# Patient Record
Sex: MALE | Race: BLACK OR AFRICAN AMERICAN | NOT HISPANIC OR LATINO | Employment: FULL TIME | URBAN - METROPOLITAN AREA
[De-identification: names, ages, dates, MRNs, and addresses within clinical notes are randomized per-mention and may not be internally consistent; named-entity substitution may affect disease eponyms.]

---

## 2017-02-10 ENCOUNTER — COMMUNICATION - HEALTHEAST (OUTPATIENT)
Dept: BEHAVIORAL HEALTH | Facility: CLINIC | Age: 57
End: 2017-02-10

## 2020-04-08 ENCOUNTER — COMMUNICATION - HEALTHEAST (OUTPATIENT)
Dept: BEHAVIORAL HEALTH | Facility: CLINIC | Age: 60
End: 2020-04-08

## 2021-06-07 NOTE — TELEPHONE ENCOUNTER
Patient left a message with [facility] and the [facility] stating he was unable to see the Suboxone prescriber he was referred to after discharge from 2700.    Called patient this afternoon to discuss options. Patient states he was able to be seen by Dr. Holcomb in her [facility] clinic yesterday. Plans to continue with Dr. Holcomb for Suboxone management.     Patient states he is doing well considering the circumstances of the current pandemic. Notes not issues or concerns.     Hemanth Christianson PA-C   Behavioral Health & Addiction Medicine  CD Unit # [phone]  Pager # [phone]

## 2021-06-16 PROBLEM — F11.20 SEVERE OPIOID USE DISORDER (H): Status: ACTIVE | Noted: 2020-02-27

## 2022-11-10 ENCOUNTER — TELEPHONE (OUTPATIENT)
Dept: NEUROSURGERY | Facility: CLINIC | Age: 62
End: 2022-11-10

## 2022-11-10 NOTE — TELEPHONE ENCOUNTER
[facility] Call Center    Phone Message    May a detailed message be left on voicemail: yes     Reason for Call: Appointment Intake    Referring Provider Name: Self refer for a 2nd Opinion   Diagnosis and/or Symptoms: Cauda Equina of the Lumbar / Sacral Spine  Action Taken: Message routed to:  [facility]    Travel Screening: Not Applicable     Kaity called in to make an urgent appt with Dr Natarajan due to the diagnosis of the patient , we could not get an appt until 12/1 so we made it for that date for now but they would like to speak with the team to see if we can do anything sooner -- they will be faxing over records and the reports / imaging. MRI was taken @ [facility]     This will be a W/C claim *Kaity will be calling back with the Claim # but all of the other info was added for now     Please c/b to discuss

## 2022-11-11 DIAGNOSIS — M54.9 BACK PAIN: Primary | ICD-10-CM

## 2022-11-15 ENCOUNTER — TELEPHONE (OUTPATIENT)
Dept: NEUROSURGERY | Facility: CLINIC | Age: 62
End: 2022-11-15

## 2022-11-15 NOTE — TELEPHONE ENCOUNTER
[facility] Call Center    Phone Message    May a detailed message be left on voicemail: yes     Reason for Call: Other: Kaity Ca, pts [facility] wanting to let. Dr Natarajan know that pt is ready to come in for consult for surgery. Kaity asking for call back at [phone]  - offered to schedule apt, Kaity declined, advised to contact pt to schedule apt.       Action Taken: Other: Spine - Dr. Mallory Natarajan    Travel Screening: Not Applicable

## 2022-11-16 ENCOUNTER — TELEPHONE (OUTPATIENT)
Dept: NEUROSURGERY | Facility: CLINIC | Age: 62
End: 2022-11-16

## 2022-11-16 NOTE — TELEPHONE ENCOUNTER
[facility] Call Center    Phone Message    May a detailed message be left on voicemail: yes     Reason for Call Kaity Who is Patient C is asking for call back to discuss Copies of Images / Order for Back. Please call Kaity Action Taken: Other: [facility]    Travel Screening: Not Applicable

## 2022-11-17 ENCOUNTER — ANCILLARY PROCEDURE (OUTPATIENT)
Dept: GENERAL RADIOLOGY | Facility: CLINIC | Age: 62
End: 2022-11-17
Attending: SURGERY
Payer: OTHER MISCELLANEOUS

## 2022-11-17 DIAGNOSIS — M54.9 BACK PAIN: ICD-10-CM

## 2022-11-17 PROCEDURE — 72110 X-RAY EXAM L-2 SPINE 4/>VWS: CPT | Mod: TC | Performed by: RADIOLOGY

## 2022-11-22 ENCOUNTER — MEDICAL CORRESPONDENCE (OUTPATIENT)
Dept: NEUROSURGERY | Facility: CLINIC | Age: 62
End: 2022-11-22

## 2022-12-01 ENCOUNTER — PREP FOR PROCEDURE (OUTPATIENT)
Dept: NEUROSURGERY | Facility: CLINIC | Age: 62
End: 2022-12-01

## 2022-12-01 ENCOUNTER — LAB (OUTPATIENT)
Dept: LAB | Facility: HOSPITAL | Age: 62
End: 2022-12-01
Payer: COMMERCIAL

## 2022-12-01 ENCOUNTER — TELEPHONE (OUTPATIENT)
Dept: NEUROSURGERY | Facility: CLINIC | Age: 62
End: 2022-12-01

## 2022-12-01 ENCOUNTER — OFFICE VISIT (OUTPATIENT)
Dept: NEUROSURGERY | Facility: CLINIC | Age: 62
End: 2022-12-01
Payer: OTHER MISCELLANEOUS

## 2022-12-01 VITALS
OXYGEN SATURATION: 98 % | SYSTOLIC BLOOD PRESSURE: 132 MMHG | BODY MASS INDEX: 30.06 KG/M2 | WEIGHT: 210 LBS | DIASTOLIC BLOOD PRESSURE: 96 MMHG | HEIGHT: 70 IN | HEART RATE: 80 BPM

## 2022-12-01 DIAGNOSIS — G83.4: Primary | ICD-10-CM

## 2022-12-01 DIAGNOSIS — G83.4 CAUDA EQUINA SYNDROME WITH NEUROGENIC BLADDER (H): Primary | ICD-10-CM

## 2022-12-01 DIAGNOSIS — M48.061 SPINAL STENOSIS, LUMBAR REGION, WITHOUT NEUROGENIC CLAUDICATION: ICD-10-CM

## 2022-12-01 DIAGNOSIS — R33.9: Primary | ICD-10-CM

## 2022-12-01 DIAGNOSIS — R20.0 LEG NUMBNESS: ICD-10-CM

## 2022-12-01 DIAGNOSIS — M51.26 LUMBAR DISC HERNIATION: ICD-10-CM

## 2022-12-01 DIAGNOSIS — R20.0 SADDLE ANESTHESIA: ICD-10-CM

## 2022-12-01 DIAGNOSIS — Z01.818 PRE-OP TESTING: ICD-10-CM

## 2022-12-01 DIAGNOSIS — R29.898 WEAKNESS OF BOTH LOWER EXTREMITIES: ICD-10-CM

## 2022-12-01 LAB
ANION GAP SERPL CALCULATED.3IONS-SCNC: 10 MMOL/L (ref 7–15)
APTT PPP: 26 SECONDS (ref 22–38)
BUN SERPL-MCNC: 10.1 MG/DL (ref 8–23)
CALCIUM SERPL-MCNC: 9.7 MG/DL (ref 8.8–10.2)
CHLORIDE SERPL-SCNC: 104 MMOL/L (ref 98–107)
CREAT SERPL-MCNC: 0.97 MG/DL (ref 0.67–1.17)
DEPRECATED HCO3 PLAS-SCNC: 26 MMOL/L (ref 22–29)
ERYTHROCYTE [DISTWIDTH] IN BLOOD BY AUTOMATED COUNT: 12.4 % (ref 10–15)
GFR SERPL CREATININE-BSD FRML MDRD: 88 ML/MIN/1.73M2
GLUCOSE SERPL-MCNC: 97 MG/DL (ref 70–99)
HCT VFR BLD AUTO: 48.5 % (ref 40–53)
HGB BLD-MCNC: 15.7 G/DL (ref 13.3–17.7)
INR PPP: 0.91 (ref 0.85–1.15)
MCH RBC QN AUTO: 31.4 PG (ref 26.5–33)
MCHC RBC AUTO-ENTMCNC: 32.4 G/DL (ref 31.5–36.5)
MCV RBC AUTO: 97 FL (ref 78–100)
PLATELET # BLD AUTO: 230 10E3/UL (ref 150–450)
POTASSIUM SERPL-SCNC: 4.1 MMOL/L (ref 3.4–5.3)
RBC # BLD AUTO: 5 10E6/UL (ref 4.4–5.9)
SODIUM SERPL-SCNC: 140 MMOL/L (ref 136–145)
WBC # BLD AUTO: 9.5 10E3/UL (ref 4–11)

## 2022-12-01 PROCEDURE — 85027 COMPLETE CBC AUTOMATED: CPT

## 2022-12-01 PROCEDURE — 85730 THROMBOPLASTIN TIME PARTIAL: CPT

## 2022-12-01 PROCEDURE — 82310 ASSAY OF CALCIUM: CPT

## 2022-12-01 PROCEDURE — 99204 OFFICE O/P NEW MOD 45 MIN: CPT | Performed by: SURGERY

## 2022-12-01 PROCEDURE — 85610 PROTHROMBIN TIME: CPT

## 2022-12-01 PROCEDURE — 36415 COLL VENOUS BLD VENIPUNCTURE: CPT

## 2022-12-01 ASSESSMENT — PATIENT HEALTH QUESTIONNAIRE - PHQ9: SUM OF ALL RESPONSES TO PHQ QUESTIONS 1-9: 17

## 2022-12-01 NOTE — PATIENT INSTRUCTIONS
Lumbar decompression L4-5    Please review COMPLETE information about your proposed surgery, pre-operative requirements, post-operative course and expectations - available in a folder provided to you in clinic!    Your surgery scheduler will call you within 3 business days to begin the process of scheduling your surgery and appointments.     Pre-Operative    Pre-operative physical / Lab work with primary care physician within 30 days of surgical date.    If all pre-op appointments/test are not completed prior to your surgery date, you will be asked to reschedule your surgery.           As part of preparation for your upcoming procedure you are required to have a test for the novel Coronavirus/COVID-19.  The test needs to be completed within 4 days (96) hours of surgery.   We will assist you in scheduling this.   You may NOT receive the COVID-19 vaccine or booster 2 weeks before or after surgery.    Readiness Visits    Prior to surgery, you may have a telephone or in person readiness visit with our RN team to discuss your upcomming surgery, results of your pre-op physical, and lab work.   If you will require a collar/neck brace after surgery, you will be fitted for one at your readiness visit prior to surgery (scheduled by the surgery scheduler).     Shower procedure    Hibiclens shower: Use one packet the night before surgery and one packet the morning of surgery for a whole body shower. Avoid face, hair, and genitals.      Eating/Drinking    Stop all solid foods 8 hours before surgery.  Stop all clear liquids 2 hours prior to arrival time     Clear liquids include water, clear juice, black coffee, or clear tea without milk, Gatorade, clear soda.     Medications - please refer to the pre-operative medication instructions sheet in your folder    Hold Aspirin, NSAIDs (Advil/Ibuprofen, Indocin, Naproxen,Nuprin,Relafen/Nabumetone, Diclofenac,Meloxicam, Aleve, Celebrex) and all vitamins and supplements x 7-10 days prior  to surgical date  You can take Tylenol (Acetaminophen) for pain up until the date of your surgery   Do not exceed 3,000 mg per day   Any other medications prescribed, please discuss with your primary care provider at your pre-operative physical. Please discuss when/if it is safe for you to stop taking blood thinners with your primary care provider.   We will NOT provide pain medications prior to surgery. We will prescribe post-op pain medications for up to 6 weeks after surgery.       FMLA/Short-term disability    If you are currently employed, you will likely need to be off work for 4-6 weeks for post-op recovery and healing.  Please fax any FMLA/short term disability paperwork to [phone], mail it into the clinic, drop it off in person, or send via a [employer] message.   You may also call our clinic with the date in which you'd like to return to work, and we can provide a work letter to your employer  We will support Short-Term Disability up to 12 weeks, beginning the date of your surgery. We do NOT support Long-Term Disability/Social Security Benefits.     Pain Management after surgery    Dealing with pain    As your body heals, you might feel a stabbing, burning, or aching pain. You may also have some numbness.  Everyone feels pain differently, we may ask you to rate your pain using a pain scale. This will let us know how much pain you feel.   Keep in mind that medicine won't take away all of your pain. It helps to try other ways to relax and ease pain.     Things to help with pain    After surgery, we will give you medicine for your pain. These medications work well, but they can make you drowsy, itchy, or sick to your stomach, and constipated. Try to take narcotics with food if they cause nausea.   For mild to moderate pain, you can take medication such as Tylenol or Ibuprofen. These can be used with narcotics and muscle relaxants. *If you have had a fusion: do NOT use NSAIDs for 6 months after surgery.   Do  NOT drive while taking narcotic pain medication  Do NOT drink alcohol while using narcotic pain medication  You can utilize ice as needed (no longer than 20 minutes at one time) you may apply this over your covered incision  Utilize heat for muscle spasms, do not apply heat over your incision  If a muscle relaxer is prescribed, please do NOT take it at the same time as your narcotic pain medication. Take them at least 90 minutes apart.   You may also use pain cream/patches on sore muscles. Do NOT apply these on your incision. Patches may be cut in 1/2 if needed.     *After your surgery, if you will be staying in-patient, a nursing team will be monitoring you closely throughout your stay and communicate your health status to your surgeon and other providers.  You will be seen by Advanced Practice Providers (e.g., nurse practitioners, clinic nurse specialists, and physician assistants) who will check on you regularly to assess the status of your surgical recovery.     Incision Care    Look at your incision site every day. You  may need a mirror, or family member to help you.   Do not submerge your incision in water such as pools, hot tubs, baths for at least 6 weeks or until incision is healed  You may get your incision wet in the shower. Allow water and soap to run over incision, and gently pat dry.   Remove the dressing the day after you are discharged from the hospital. Keep the incision clean and dry at all times. This may require several bandage changes.   Contact us right away if you have:   Fever over 101 degrees farenheit  Green or yellow drainage (pus) from your incision or increased bloody drainage   Redness, swelling, or warmth at your surgery site   Notify the clinic [phone].    Activity Restrictions    For the first 6 weeks, no lifting,pushing, or pulling > 5-10 pounds, no bending, twisting.  Use the stairs in moderation   Walking: Walking is the best way to start exercise after surgery. Take short  frequent walks. You may gradually increase the distance as tolerated. If you feel pain, decrease your activity, but DO NOT stop walking. Walking will help you regain strength.  Avoid prolonged positioning for longer than 30 minutes (change positions frequently while awake)  No contact sports until after follow up visit  No high impact activities such as; running/jogging, snowmobile or 4 coyne riding or any other recreational vehicles until deemed safe by your surgeon/care team.   Please call the clinic if you develop any of the following symptoms:  Swelling and/or warmth in one or both legs  Pain or tenderness in your leg, ankle, foot, or arm   Red or discolored/pale skin     Post-Op Follow Up Appointments    We will call you to schedule these appointments after your discharge from the hospital.   Incision assessment within 2 weeks with a Registered Nurse   6 week post-op with a Nurse Practitioner/Physician Assistant. Your surgeon will be available on this day.

## 2022-12-01 NOTE — NURSING NOTE
Neurosurgery consultation was requested by: ROMINA   Pain: back   Radicular Pain is present: lower back goes down to  both legs   Lhermitte sign: no   Motor complaints: both legs   Sensory complaints: both legs   Gait and balance issues: yes   Bowel or bladder issues: yes   Duration of SX is: 31/8/22  The symptoms are worse with: sitting standing and walking   The symptoms are better with: laying down and heat   Injury: work injury   Severity is: severe   Patient has tried the following conservative measures: none   KATHERINE score is: 96%  Ronald Timmons MA

## 2022-12-01 NOTE — TELEPHONE ENCOUNTER
[facility] Call Center    Phone Message    May a detailed message be left on voicemail: yes     Reason for Call: Other: Pt called returning call from [facility].    Please call Pt back at [phone].    Action Taken: Message routed to:  [facility] Neurosurgery    Travel Screening: Not Applicable

## 2022-12-01 NOTE — LETTER
12/1/2022         RE: Richard Hickman  [address]  [place] [address]        Dear Colleague,    Thank you for referring your patient, Richard Hickman, to the [facility]. Please see a copy of my visit note below.    NEUROSURGERY CONSULTATION NOTE      CONSULTATION ASSESSMENT AND PLAN:    61 yo male who presents with symptoms of cauda equina syndrome including leg weakness, numbness, saddle anesthesia and urinary retention. Lumbar xray shows reversal of normal lordosis centered at L3 without instability.   MRI of his lumbar spine shows severe canal stenosis at L2-3 and L4-5. L2-3 he has ligamentous and facet hypertrophy in setting of a broad based disc contributing to his severe stenosis and at lumbar 4-5 he has a large disc herniation very severely impinging his nerve roots. Recommend urgent decompression including L2-3 midline sparing decompression with hemilaminectomies, medial facetectomies and foraminotomies and at lumbar 4-5 bilateral laminectomy, medial facetectomies, foraminotomies and microdiscectomies. Risks and benefits of surgery discussed including but not limited to infection, hematoma, nerve damage including paralysis, post op radiculitis, durotomy, residual symptoms, risks associated with the use of general anesthesia, blood clots in the lungs or legs. We discussed symptoms have been ongoing some time so recovery may take a while and he may have residual deficits. The patient and his wife appeared to understand and agreed to proceed.     I spent more than 45 minutes in this apt, examining the pt, reviewing the scans, reviewing notes from chart, discussing treatment options with risks and benefits and coordinating care.     Mallory Natarajan MD      HPI:  61 yo male who presents with leg numbness which extends into hips. He has zingers in his legs with laying down. Shooting pain down legs in any position. Symptoms can improve with laying down with flexion of  his legs. Has not been able to walk. He crawls around his house to get to bathroom etc. Difficulty initiating urination and having retention. He will have urinary leakage following that. Constipated. Difficulty performing bowel movement due to pain. Has saddle anesthesia in groin and buttocks. Can have erection but cannot feel that.     Symptoms started day after injury. 10/31/22. 11/1/22 was when symptoms started.     Horse tree chestnut natural therapy - pain does lighten up.opoid's- does help him sleep but became agitated. So he can't tolerate that.     Past Medical History:   Diagnosis Date     Essential hypertension      Past surgical history-  Appendectomy at 18    REVIEW OF SYSTEMS:  See ROS Form under media     MEDICATIONS:  Current Outpatient Medications   Medication Sig Dispense Refill     amLODIPine (NORVASC) 5 MG tablet [AMLODIPINE (NORVASC) 5 MG TABLET] Take 1 tablet (5 mg total) by mouth daily. 30 tablet 0     aspirin 81 mg chewable tablet [ASPIRIN 81 MG CHEWABLE TABLET] Chew 81 mg every morning.       atorvastatin (LIPITOR) 20 MG tablet [ATORVASTATIN (LIPITOR) 20 MG TABLET] Take 1 tablet (20 mg total) by mouth at bedtime. 30 tablet 0     buprenorphine-naloxone (SUBOXONE SL FILM) 2-0.5 mg Film per sublingual film [BUPRENORPHINE-NALOXONE (SUBOXONE SL FILM) 2-0.5 MG FILM PER SUBLINGUAL FILM] Place 1 Film under the tongue 4 (four) times a day. 60 Film 0     cholecalciferol, vitamin D3, 1,000 unit (25 mcg) tablet [CHOLECALCIFEROL, VITAMIN D3, 1,000 UNIT (25 MCG) TABLET] Take 1,000 Units by mouth every morning.       naloxone (NARCAN) 4 mg/actuation nasal spray [NALOXONE (NARCAN) 4 MG/ACTUATION NASAL SPRAY] 1 spray (4 mg dose) into one nostril for opioid reversal. Call 911. May repeat if no response in 3 minutes. 1 Box 0     OMEGA-3/DHA/EPA/FISH OIL (FISH OIL-OMEGA-3 FATTY ACIDS) 300-1,000 mg capsule [OMEGA-3/DHA/EPA/FISH OIL (FISH OIL-OMEGA-3 FATTY ACIDS) 300-1,000 MG CAPSULE] Take 1 capsule (1 g total) by  "mouth daily. 30 capsule 0     terbinafine HCl (LAMISIL) 1 % cream [TERBINAFINE HCL (LAMISIL) 1 % CREAM] Apply on both feet BID. 30 g 2     traZODone (DESYREL) 100 MG tablet [TRAZODONE (DESYREL) 100 MG TABLET] Take 0.5-1 tablets ( mg total) by mouth at bedtime as needed, may repeat once for sleep. 30 tablet 0         ALLERGIES/SENSITIVITIES:     No Known Allergies    PERTINENT SOCIAL HISTORY:   Social History     Socioeconomic History     Marital status: Single   Tobacco Use     Smoking status: Every Day     Packs/day: 0.10     Types: Cigarettes     Start date: 6/13/1981     Smokeless tobacco: Never   Substance and Sexual Activity     Alcohol use: No     Drug use: No     Sexual activity: Never         FAMILY HISTORY:  No family history on file.     PHYSICAL EXAM:   Constitutional: BP (!) 132/96   Pulse 80   Ht 5' 10\" (1.778 m)   Wt 210 lb (95.3 kg)   SpO2 98%   BMI 30.13 kg/m       Mental Status: A & O in no acute distress.  Affect is appropriate.  Speech is fluent.  Recent and remote memory are intact.  Attention span and concentration are normal.    Motor:  Normal bulk and tone all muscle groups of upper and lower extremities.    Strength:  Diffusely 4/5 in LE      Sensory: Sensation intact bilaterally to light touch except diminished in saddle region and diffusely in LE     Coordination: laying down during visit due to uncomfortableness. Ambulation not observed.       Reflexes; supinator, biceps, triceps, knee/ ankle jerk intact. No north's    IMAGING:  I personally reviewed all radiographic images         Cc:   No Ref-Primary, Physician               Again, thank you for allowing me to participate in the care of your patient.        Sincerely,        Mallory Natarajan MD    "

## 2022-12-01 NOTE — PROGRESS NOTES
NEUROSURGERY CONSULTATION NOTE      CONSULTATION ASSESSMENT AND PLAN:    61 yo male who presents with symptoms of cauda equina syndrome including leg weakness, numbness, saddle anesthesia and urinary retention. Lumbar xray shows reversal of normal lordosis centered at L3 without instability.   MRI of his lumbar spine shows severe canal stenosis at L2-3 and L4-5. L2-3 he has ligamentous and facet hypertrophy in setting of a broad based disc contributing to his severe stenosis and at lumbar 4-5 he has a large disc herniation very severely impinging his nerve roots. Recommend urgent decompression including L2-3 midline sparing decompression with hemilaminectomies, medial facetectomies and foraminotomies and at lumbar 4-5 bilateral laminectomy, medial facetectomies, foraminotomies and microdiscectomies. Risks and benefits of surgery discussed including but not limited to infection, hematoma, nerve damage including paralysis, post op radiculitis, durotomy, residual symptoms, risks associated with the use of general anesthesia, blood clots in the lungs or legs. We discussed symptoms have been ongoing some time so recovery may take a while and he may have residual deficits. The patient and his wife appeared to understand and agreed to proceed.     I spent more than 45 minutes in this apt, examining the pt, reviewing the scans, reviewing notes from chart, discussing treatment options with risks and benefits and coordinating care.     Mallory Natarajan MD      HPI:  61 yo male who presents with leg numbness which extends into hips. He has zingers in his legs with laying down. Shooting pain down legs in any position. Symptoms can improve with laying down with flexion of his legs. Has not been able to walk. He crawls around his house to get to bathroom etc. Difficulty initiating urination and having retention. He will have urinary leakage following that. Constipated. Difficulty performing bowel movement due to pain. Has  saddle anesthesia in groin and buttocks. Can have erection but cannot feel that.     Symptoms started day after injury. 10/31/22. 11/1/22 was when symptoms started.     Horse tree chestnut natural therapy - pain does lighten up.opoid's- does help him sleep but became agitated. So he can't tolerate that.     Past Medical History:   Diagnosis Date     Essential hypertension      Past surgical history-  Appendectomy at 18    REVIEW OF SYSTEMS:  See ROS Form under media     MEDICATIONS:  Current Outpatient Medications   Medication Sig Dispense Refill     amLODIPine (NORVASC) 5 MG tablet [AMLODIPINE (NORVASC) 5 MG TABLET] Take 1 tablet (5 mg total) by mouth daily. 30 tablet 0     aspirin 81 mg chewable tablet [ASPIRIN 81 MG CHEWABLE TABLET] Chew 81 mg every morning.       atorvastatin (LIPITOR) 20 MG tablet [ATORVASTATIN (LIPITOR) 20 MG TABLET] Take 1 tablet (20 mg total) by mouth at bedtime. 30 tablet 0     buprenorphine-naloxone (SUBOXONE SL FILM) 2-0.5 mg Film per sublingual film [BUPRENORPHINE-NALOXONE (SUBOXONE SL FILM) 2-0.5 MG FILM PER SUBLINGUAL FILM] Place 1 Film under the tongue 4 (four) times a day. 60 Film 0     cholecalciferol, vitamin D3, 1,000 unit (25 mcg) tablet [CHOLECALCIFEROL, VITAMIN D3, 1,000 UNIT (25 MCG) TABLET] Take 1,000 Units by mouth every morning.       naloxone (NARCAN) 4 mg/actuation nasal spray [NALOXONE (NARCAN) 4 MG/ACTUATION NASAL SPRAY] 1 spray (4 mg dose) into one nostril for opioid reversal. Call 911. May repeat if no response in 3 minutes. 1 Box 0     OMEGA-3/DHA/EPA/FISH OIL (FISH OIL-OMEGA-3 FATTY ACIDS) 300-1,000 mg capsule [OMEGA-3/DHA/EPA/FISH OIL (FISH OIL-OMEGA-3 FATTY ACIDS) 300-1,000 MG CAPSULE] Take 1 capsule (1 g total) by mouth daily. 30 capsule 0     terbinafine HCl (LAMISIL) 1 % cream [TERBINAFINE HCL (LAMISIL) 1 % CREAM] Apply on both feet BID. 30 g 2     traZODone (DESYREL) 100 MG tablet [TRAZODONE (DESYREL) 100 MG TABLET] Take 0.5-1 tablets ( mg total) by  "mouth at bedtime as needed, may repeat once for sleep. 30 tablet 0         ALLERGIES/SENSITIVITIES:     No Known Allergies    PERTINENT SOCIAL HISTORY:   Social History     Socioeconomic History     Marital status: Single   Tobacco Use     Smoking status: Every Day     Packs/day: 0.10     Types: Cigarettes     Start date: 6/13/1981     Smokeless tobacco: Never   Substance and Sexual Activity     Alcohol use: No     Drug use: No     Sexual activity: Never         FAMILY HISTORY:  No family history on file.     PHYSICAL EXAM:   Constitutional: BP (!) 132/96   Pulse 80   Ht 5' 10\" (1.778 m)   Wt 210 lb (95.3 kg)   SpO2 98%   BMI 30.13 kg/m       Mental Status: A & O in no acute distress.  Affect is appropriate.  Speech is fluent.  Recent and remote memory are intact.  Attention span and concentration are normal.    Motor:  Normal bulk and tone all muscle groups of upper and lower extremities.    Strength:  Diffusely 4/5 in LE      Sensory: Sensation intact bilaterally to light touch except diminished in saddle region and diffusely in LE     Coordination: laying down during visit due to uncomfortableness. Ambulation not observed.       Reflexes; supinator, biceps, triceps, knee/ ankle jerk intact. No north's    IMAGING:  I personally reviewed all radiographic images         Cc:   No Ref-Primary, Physician           "

## 2022-12-06 NOTE — TELEPHONE ENCOUNTER
Patient is scheduled for surgery on 12/8/22. I gave the patient surgery details, and he verbalized understanding. Patient stated that he was told by  that he didn't need to have a COVID test.

## 2022-12-07 ENCOUNTER — ANESTHESIA EVENT (OUTPATIENT)
Dept: SURGERY | Facility: HOSPITAL | Age: 62
DRG: 030 | End: 2022-12-07
Payer: OTHER MISCELLANEOUS

## 2022-12-07 ENCOUNTER — TELEPHONE (OUTPATIENT)
Dept: NEUROSURGERY | Facility: CLINIC | Age: 62
End: 2022-12-07

## 2022-12-07 NOTE — TELEPHONE ENCOUNTER
Called patient, discussed surgery, post-op course, expectations, follow up plan.    Reviewed H&P from - 12/2/22 cleared for surgery.   Labs - WNL     MRI done on 11/3/22 - done at [facility], in [place]    To OR as planned. Check in - 0930    Nothing to eat or drink after midnight the night before surgery.     Bring all pertinent films to hospital the day of surgery.     Continue to refrain from NSAIDS (Ibuprofen, Aleve, Naprosyn), ASA, Over the counter herbal medications or supplements, anti-coagulants and blood thinners.     Shower Instructions: using a washcloth and a bottle of provided Hibiclens, wash your body, avoiding your face, hair, and genitals. Preferably, shower the night before surgery and the morning of surgery using a half a bottle each time for your whole body shower.    Patient confirmed they have help/assistance in place at home upon discharge, his wife.    Patient was informed that we will provide prescription of pain medication for post-operative pain if he chooses to use it. Pt has a hx of opioid abuse and does NOT want opioids prescribed after surgery. PCP recommends suboxone rx 4mg BID on the first day, then 4mg TID after if opioids are being prescribed.     Instructed patient about the following: After your surgery, if you will be staying in-patient, a nursing provider team will be monitoring you closely throughout your stay and communicate your health status to your surgeon and other providers.  You will be seen by Advanced Practice Providers (e.g., nurse practitioners, clinic nurse specialist, and physician assistants) who will check on you regularly to assess the status of your surgery.   All of pt's questions were answered to his satisfaction. He was informed that we will call after discharge to schedule all necessary post-op follow up appointments.     Destiny Thompson RN

## 2022-12-08 ENCOUNTER — APPOINTMENT (OUTPATIENT)
Dept: RADIOLOGY | Facility: HOSPITAL | Age: 62
DRG: 030 | End: 2022-12-08
Attending: NURSE PRACTITIONER
Payer: OTHER MISCELLANEOUS

## 2022-12-08 ENCOUNTER — HOSPITAL ENCOUNTER (INPATIENT)
Facility: HOSPITAL | Age: 62
LOS: 2 days | Discharge: HOME | DRG: 030 | End: 2022-12-10
Attending: SURGERY | Admitting: SURGERY
Payer: OTHER MISCELLANEOUS

## 2022-12-08 ENCOUNTER — ANESTHESIA (OUTPATIENT)
Dept: SURGERY | Facility: HOSPITAL | Age: 62
DRG: 030 | End: 2022-12-08
Payer: OTHER MISCELLANEOUS

## 2022-12-08 DIAGNOSIS — G83.4 CAUDA EQUINA COMPRESSION (H): Primary | ICD-10-CM

## 2022-12-08 DIAGNOSIS — I10 ESSENTIAL HYPERTENSION: ICD-10-CM

## 2022-12-08 LAB — GLUCOSE BLDC GLUCOMTR-MCNC: 90 MG/DL (ref 70–99)

## 2022-12-08 PROCEDURE — 63047 LAM FACETEC & FORAMOT LUMBAR: CPT | Performed by: SURGERY

## 2022-12-08 PROCEDURE — 63030 LAMOT DCMPRN NRV RT 1 LMBR: CPT | Mod: 59 | Performed by: SURGERY

## 2022-12-08 PROCEDURE — 258N000003 HC RX IP 258 OP 636: Performed by: ANESTHESIOLOGY

## 2022-12-08 PROCEDURE — 999N000141 HC STATISTIC PRE-PROCEDURE NURSING ASSESSMENT: Performed by: SURGERY

## 2022-12-08 PROCEDURE — 250N000011 HC RX IP 250 OP 636: Performed by: NURSE PRACTITIONER

## 2022-12-08 PROCEDURE — 250N000011 HC RX IP 250 OP 636: Performed by: ANESTHESIOLOGY

## 2022-12-08 PROCEDURE — 99254 IP/OBS CNSLTJ NEW/EST MOD 60: CPT | Performed by: CLINICAL NURSE SPECIALIST

## 2022-12-08 PROCEDURE — 250N000013 HC RX MED GY IP 250 OP 250 PS 637: Performed by: NURSE PRACTITIONER

## 2022-12-08 PROCEDURE — 999N000063 XR CROSSTABLE LATERAL LUMBAR SPINE PORTABLE

## 2022-12-08 PROCEDURE — 370N000017 HC ANESTHESIA TECHNICAL FEE, PER MIN: Performed by: SURGERY

## 2022-12-08 PROCEDURE — 999N000248 HC STATISTIC IV INSERT WITH US BY RN

## 2022-12-08 PROCEDURE — 360N000077 HC SURGERY LEVEL 4, PER MIN: Performed by: SURGERY

## 2022-12-08 PROCEDURE — 72020 X-RAY EXAM OF SPINE 1 VIEW: CPT

## 2022-12-08 PROCEDURE — 250N000009 HC RX 250: Performed by: NURSE PRACTITIONER

## 2022-12-08 PROCEDURE — 272N000001 HC OR GENERAL SUPPLY STERILE: Performed by: SURGERY

## 2022-12-08 PROCEDURE — 0SB20ZZ EXCISION OF LUMBAR VERTEBRAL DISC, OPEN APPROACH: ICD-10-PCS | Performed by: SURGERY

## 2022-12-08 PROCEDURE — 120N000001 HC R&B MED SURG/OB

## 2022-12-08 PROCEDURE — 258N000003 HC RX IP 258 OP 636: Performed by: NURSE PRACTITIONER

## 2022-12-08 PROCEDURE — 258N000003 HC RX IP 258 OP 636: Performed by: NURSE ANESTHETIST, CERTIFIED REGISTERED

## 2022-12-08 PROCEDURE — 82962 GLUCOSE BLOOD TEST: CPT

## 2022-12-08 PROCEDURE — 710N000009 HC RECOVERY PHASE 1, LEVEL 1, PER MIN: Performed by: SURGERY

## 2022-12-08 PROCEDURE — 250N000009 HC RX 250: Performed by: SURGERY

## 2022-12-08 PROCEDURE — 69990 MICROSURGERY ADD-ON: CPT | Mod: 59 | Performed by: SURGERY

## 2022-12-08 PROCEDURE — 63030 LAMOT DCMPRN NRV RT 1 LMBR: CPT | Mod: AS | Performed by: NURSE PRACTITIONER

## 2022-12-08 PROCEDURE — 250N000025 HC SEVOFLURANE, PER MIN: Performed by: SURGERY

## 2022-12-08 PROCEDURE — 250N000013 HC RX MED GY IP 250 OP 250 PS 637: Performed by: ANESTHESIOLOGY

## 2022-12-08 PROCEDURE — 250N000011 HC RX IP 250 OP 636: Performed by: NURSE ANESTHETIST, CERTIFIED REGISTERED

## 2022-12-08 PROCEDURE — 01NB0ZZ RELEASE LUMBAR NERVE, OPEN APPROACH: ICD-10-PCS | Performed by: SURGERY

## 2022-12-08 PROCEDURE — 63047 LAM FACETEC & FORAMOT LUMBAR: CPT | Mod: AS | Performed by: NURSE PRACTITIONER

## 2022-12-08 PROCEDURE — 250N000009 HC RX 250: Performed by: NURSE ANESTHETIST, CERTIFIED REGISTERED

## 2022-12-08 PROCEDURE — 250N000009 HC RX 250: Performed by: ANESTHESIOLOGY

## 2022-12-08 PROCEDURE — 00NY0ZZ RELEASE LUMBAR SPINAL CORD, OPEN APPROACH: ICD-10-PCS | Performed by: SURGERY

## 2022-12-08 PROCEDURE — 250N000013 HC RX MED GY IP 250 OP 250 PS 637: Performed by: CLINICAL NURSE SPECIALIST

## 2022-12-08 RX ORDER — POLYETHYLENE GLYCOL 3350 17 G/17G
17 POWDER, FOR SOLUTION ORAL DAILY
Status: DISCONTINUED | OUTPATIENT
Start: 2022-12-09 | End: 2022-12-10 | Stop reason: HOSPADM

## 2022-12-08 RX ORDER — ONDANSETRON 2 MG/ML
4 INJECTION INTRAMUSCULAR; INTRAVENOUS EVERY 30 MIN PRN
Status: DISCONTINUED | OUTPATIENT
Start: 2022-12-08 | End: 2022-12-08 | Stop reason: HOSPADM

## 2022-12-08 RX ORDER — FAMOTIDINE 20 MG/1
20 TABLET, FILM COATED ORAL 2 TIMES DAILY
Status: DISCONTINUED | OUTPATIENT
Start: 2022-12-08 | End: 2022-12-10 | Stop reason: HOSPADM

## 2022-12-08 RX ORDER — PROPOFOL 10 MG/ML
INJECTION, EMULSION INTRAVENOUS PRN
Status: DISCONTINUED | OUTPATIENT
Start: 2022-12-08 | End: 2022-12-08

## 2022-12-08 RX ORDER — ONDANSETRON 4 MG/1
4 TABLET, ORALLY DISINTEGRATING ORAL EVERY 30 MIN PRN
Status: DISCONTINUED | OUTPATIENT
Start: 2022-12-08 | End: 2022-12-08 | Stop reason: HOSPADM

## 2022-12-08 RX ORDER — CEFAZOLIN SODIUM/WATER 2 G/20 ML
2 SYRINGE (ML) INTRAVENOUS
Status: COMPLETED | OUTPATIENT
Start: 2022-12-08 | End: 2022-12-08

## 2022-12-08 RX ORDER — SODIUM CHLORIDE, SODIUM LACTATE, POTASSIUM CHLORIDE, CALCIUM CHLORIDE 600; 310; 30; 20 MG/100ML; MG/100ML; MG/100ML; MG/100ML
INJECTION, SOLUTION INTRAVENOUS CONTINUOUS
Status: DISCONTINUED | OUTPATIENT
Start: 2022-12-08 | End: 2022-12-08 | Stop reason: HOSPADM

## 2022-12-08 RX ORDER — DEXAMETHASONE SODIUM PHOSPHATE 4 MG/ML
3 INJECTION, SOLUTION INTRA-ARTICULAR; INTRALESIONAL; INTRAMUSCULAR; INTRAVENOUS; SOFT TISSUE 3 TIMES DAILY
Status: DISCONTINUED | OUTPATIENT
Start: 2022-12-08 | End: 2022-12-08

## 2022-12-08 RX ORDER — BUPIVACAINE HYDROCHLORIDE AND EPINEPHRINE 2.5; 5 MG/ML; UG/ML
INJECTION, SOLUTION EPIDURAL; INFILTRATION; INTRACAUDAL; PERINEURAL PRN
Status: DISCONTINUED | OUTPATIENT
Start: 2022-12-08 | End: 2022-12-08 | Stop reason: HOSPADM

## 2022-12-08 RX ORDER — GLYCOPYRROLATE 0.2 MG/ML
INJECTION, SOLUTION INTRAMUSCULAR; INTRAVENOUS PRN
Status: DISCONTINUED | OUTPATIENT
Start: 2022-12-08 | End: 2022-12-08

## 2022-12-08 RX ORDER — EPHEDRINE SULFATE 50 MG/ML
INJECTION, SOLUTION INTRAMUSCULAR; INTRAVENOUS; SUBCUTANEOUS PRN
Status: DISCONTINUED | OUTPATIENT
Start: 2022-12-08 | End: 2022-12-08

## 2022-12-08 RX ORDER — SODIUM CHLORIDE 9 MG/ML
INJECTION, SOLUTION INTRAVENOUS CONTINUOUS
Status: DISCONTINUED | OUTPATIENT
Start: 2022-12-08 | End: 2022-12-09

## 2022-12-08 RX ORDER — ONDANSETRON 2 MG/ML
4 INJECTION INTRAMUSCULAR; INTRAVENOUS EVERY 6 HOURS PRN
Status: DISCONTINUED | OUTPATIENT
Start: 2022-12-08 | End: 2022-12-10 | Stop reason: HOSPADM

## 2022-12-08 RX ORDER — AMLODIPINE BESYLATE 5 MG/1
5 TABLET ORAL DAILY
Status: DISCONTINUED | OUTPATIENT
Start: 2022-12-09 | End: 2022-12-10 | Stop reason: HOSPADM

## 2022-12-08 RX ORDER — UREA 10 %
500 LOTION (ML) TOPICAL 2 TIMES DAILY
Status: DISCONTINUED | OUTPATIENT
Start: 2022-12-08 | End: 2022-12-10 | Stop reason: HOSPADM

## 2022-12-08 RX ORDER — ACETAMINOPHEN 325 MG/1
650 TABLET ORAL EVERY 4 HOURS PRN
Status: DISCONTINUED | OUTPATIENT
Start: 2022-12-11 | End: 2022-12-10 | Stop reason: HOSPADM

## 2022-12-08 RX ORDER — GABAPENTIN 300 MG/1
300 CAPSULE ORAL 2 TIMES DAILY
Status: DISCONTINUED | OUTPATIENT
Start: 2022-12-08 | End: 2022-12-09

## 2022-12-08 RX ORDER — ONDANSETRON 2 MG/ML
INJECTION INTRAMUSCULAR; INTRAVENOUS PRN
Status: DISCONTINUED | OUTPATIENT
Start: 2022-12-08 | End: 2022-12-08

## 2022-12-08 RX ORDER — MAGNESIUM HYDROXIDE 1200 MG/15ML
LIQUID ORAL PRN
Status: DISCONTINUED | OUTPATIENT
Start: 2022-12-08 | End: 2022-12-08 | Stop reason: HOSPADM

## 2022-12-08 RX ORDER — LIDOCAINE 40 MG/G
CREAM TOPICAL
Status: DISCONTINUED | OUTPATIENT
Start: 2022-12-08 | End: 2022-12-08 | Stop reason: HOSPADM

## 2022-12-08 RX ORDER — HYDROMORPHONE HYDROCHLORIDE 1 MG/ML
0.4 INJECTION, SOLUTION INTRAMUSCULAR; INTRAVENOUS; SUBCUTANEOUS EVERY 5 MIN PRN
Status: DISCONTINUED | OUTPATIENT
Start: 2022-12-08 | End: 2022-12-08 | Stop reason: HOSPADM

## 2022-12-08 RX ORDER — ACETAMINOPHEN 325 MG/1
975 TABLET ORAL EVERY 8 HOURS
Status: DISCONTINUED | OUTPATIENT
Start: 2022-12-08 | End: 2022-12-10 | Stop reason: HOSPADM

## 2022-12-08 RX ORDER — ATORVASTATIN CALCIUM 10 MG/1
20 TABLET, FILM COATED ORAL AT BEDTIME
Status: DISCONTINUED | OUTPATIENT
Start: 2022-12-08 | End: 2022-12-10 | Stop reason: HOSPADM

## 2022-12-08 RX ORDER — KETAMINE HYDROCHLORIDE 10 MG/ML
INJECTION INTRAMUSCULAR; INTRAVENOUS PRN
Status: DISCONTINUED | OUTPATIENT
Start: 2022-12-08 | End: 2022-12-08

## 2022-12-08 RX ORDER — MULTIVITAMIN,THERAPEUTIC
1 TABLET ORAL DAILY
Status: DISCONTINUED | OUTPATIENT
Start: 2022-12-09 | End: 2022-12-10 | Stop reason: HOSPADM

## 2022-12-08 RX ORDER — LIDOCAINE HYDROCHLORIDE 10 MG/ML
INJECTION, SOLUTION INFILTRATION; PERINEURAL PRN
Status: DISCONTINUED | OUTPATIENT
Start: 2022-12-08 | End: 2022-12-08

## 2022-12-08 RX ORDER — DEXAMETHASONE SODIUM PHOSPHATE 10 MG/ML
10 INJECTION, SOLUTION INTRAMUSCULAR; INTRAVENOUS ONCE
Status: COMPLETED | OUTPATIENT
Start: 2022-12-08 | End: 2022-12-08

## 2022-12-08 RX ORDER — FENTANYL CITRATE 50 UG/ML
50 INJECTION, SOLUTION INTRAMUSCULAR; INTRAVENOUS EVERY 5 MIN PRN
Status: DISCONTINUED | OUTPATIENT
Start: 2022-12-08 | End: 2022-12-08 | Stop reason: HOSPADM

## 2022-12-08 RX ORDER — MAGNESIUM SULFATE 4 G/50ML
4 INJECTION INTRAVENOUS ONCE
Status: COMPLETED | OUTPATIENT
Start: 2022-12-08 | End: 2022-12-08

## 2022-12-08 RX ORDER — DIAZEPAM 5 MG
5 TABLET ORAL EVERY 4 HOURS PRN
Status: DISCONTINUED | OUTPATIENT
Start: 2022-12-08 | End: 2022-12-08

## 2022-12-08 RX ORDER — FAMOTIDINE 20 MG/1
20 TABLET, FILM COATED ORAL ONCE
Status: COMPLETED | OUTPATIENT
Start: 2022-12-08 | End: 2022-12-08

## 2022-12-08 RX ORDER — METHOCARBAMOL 500 MG/1
500 TABLET, FILM COATED ORAL EVERY 6 HOURS PRN
Status: DISCONTINUED | OUTPATIENT
Start: 2022-12-08 | End: 2022-12-10 | Stop reason: HOSPADM

## 2022-12-08 RX ORDER — DEXMEDETOMIDINE HYDROCHLORIDE 4 UG/ML
.1-1.2 INJECTION, SOLUTION INTRAVENOUS CONTINUOUS
Status: DISCONTINUED | OUTPATIENT
Start: 2022-12-08 | End: 2022-12-08 | Stop reason: HOSPADM

## 2022-12-08 RX ORDER — FENTANYL CITRATE 50 UG/ML
INJECTION, SOLUTION INTRAMUSCULAR; INTRAVENOUS PRN
Status: DISCONTINUED | OUTPATIENT
Start: 2022-12-08 | End: 2022-12-08

## 2022-12-08 RX ORDER — DEXAMETHASONE SODIUM PHOSPHATE 4 MG/ML
3 INJECTION, SOLUTION INTRA-ARTICULAR; INTRALESIONAL; INTRAMUSCULAR; INTRAVENOUS; SOFT TISSUE 3 TIMES DAILY
Status: DISCONTINUED | OUTPATIENT
Start: 2022-12-09 | End: 2022-12-09

## 2022-12-08 RX ORDER — PROCHLORPERAZINE MALEATE 10 MG
10 TABLET ORAL EVERY 6 HOURS PRN
Status: DISCONTINUED | OUTPATIENT
Start: 2022-12-08 | End: 2022-12-10 | Stop reason: HOSPADM

## 2022-12-08 RX ORDER — LORATADINE 10 MG/1
10 TABLET ORAL DAILY PRN
Status: DISCONTINUED | OUTPATIENT
Start: 2022-12-08 | End: 2022-12-10 | Stop reason: HOSPADM

## 2022-12-08 RX ORDER — HYDROMORPHONE HYDROCHLORIDE 1 MG/ML
0.2 INJECTION, SOLUTION INTRAMUSCULAR; INTRAVENOUS; SUBCUTANEOUS EVERY 5 MIN PRN
Status: DISCONTINUED | OUTPATIENT
Start: 2022-12-08 | End: 2022-12-08 | Stop reason: HOSPADM

## 2022-12-08 RX ORDER — CEFAZOLIN SODIUM/WATER 2 G/20 ML
2 SYRINGE (ML) INTRAVENOUS SEE ADMIN INSTRUCTIONS
Status: DISCONTINUED | OUTPATIENT
Start: 2022-12-08 | End: 2022-12-08 | Stop reason: HOSPADM

## 2022-12-08 RX ORDER — AMOXICILLIN 250 MG
1 CAPSULE ORAL 2 TIMES DAILY
Status: DISCONTINUED | OUTPATIENT
Start: 2022-12-08 | End: 2022-12-10 | Stop reason: HOSPADM

## 2022-12-08 RX ORDER — GINSENG 100 MG
CAPSULE ORAL PRN
Status: DISCONTINUED | OUTPATIENT
Start: 2022-12-08 | End: 2022-12-08 | Stop reason: HOSPADM

## 2022-12-08 RX ORDER — BISACODYL 10 MG
10 SUPPOSITORY, RECTAL RECTAL DAILY PRN
Status: DISCONTINUED | OUTPATIENT
Start: 2022-12-08 | End: 2022-12-10 | Stop reason: HOSPADM

## 2022-12-08 RX ORDER — ONDANSETRON 4 MG/1
4 TABLET, ORALLY DISINTEGRATING ORAL EVERY 6 HOURS PRN
Status: DISCONTINUED | OUTPATIENT
Start: 2022-12-08 | End: 2022-12-10 | Stop reason: HOSPADM

## 2022-12-08 RX ORDER — FENTANYL CITRATE 50 UG/ML
25 INJECTION, SOLUTION INTRAMUSCULAR; INTRAVENOUS EVERY 5 MIN PRN
Status: DISCONTINUED | OUTPATIENT
Start: 2022-12-08 | End: 2022-12-08 | Stop reason: HOSPADM

## 2022-12-08 RX ORDER — ACETAMINOPHEN 325 MG/1
975 TABLET ORAL EVERY 6 HOURS
Status: DISCONTINUED | OUTPATIENT
Start: 2022-12-08 | End: 2022-12-08 | Stop reason: HOSPADM

## 2022-12-08 RX ORDER — ACETAMINOPHEN 325 MG/1
975 TABLET ORAL ONCE
Status: COMPLETED | OUTPATIENT
Start: 2022-12-08 | End: 2022-12-08

## 2022-12-08 RX ORDER — VITAMIN B COMPLEX
1000 TABLET ORAL EVERY MORNING
Status: DISCONTINUED | OUTPATIENT
Start: 2022-12-09 | End: 2022-12-10 | Stop reason: HOSPADM

## 2022-12-08 RX ADMIN — Medication 5 MG: at 11:45

## 2022-12-08 RX ADMIN — ROCURONIUM BROMIDE 10 MG: 50 INJECTION, SOLUTION INTRAVENOUS at 13:50

## 2022-12-08 RX ADMIN — KETAMINE HYDROCHLORIDE 10 MG: 10 INJECTION, SOLUTION INTRAMUSCULAR; INTRAVENOUS at 13:27

## 2022-12-08 RX ADMIN — ACETAMINOPHEN 975 MG: 325 TABLET ORAL at 17:51

## 2022-12-08 RX ADMIN — ROCURONIUM BROMIDE 20 MG: 50 INJECTION, SOLUTION INTRAVENOUS at 11:55

## 2022-12-08 RX ADMIN — FENTANYL CITRATE 100 MCG: 50 INJECTION, SOLUTION INTRAMUSCULAR; INTRAVENOUS at 10:29

## 2022-12-08 RX ADMIN — SODIUM CHLORIDE, POTASSIUM CHLORIDE, SODIUM LACTATE AND CALCIUM CHLORIDE: 600; 310; 30; 20 INJECTION, SOLUTION INTRAVENOUS at 09:57

## 2022-12-08 RX ADMIN — ATORVASTATIN CALCIUM 20 MG: 10 TABLET, FILM COATED ORAL at 20:51

## 2022-12-08 RX ADMIN — HYDROMORPHONE HYDROCHLORIDE 0.5 MG: 1 INJECTION, SOLUTION INTRAMUSCULAR; INTRAVENOUS; SUBCUTANEOUS at 12:46

## 2022-12-08 RX ADMIN — Medication 500 MG: at 20:58

## 2022-12-08 RX ADMIN — Medication 5 MG: at 11:44

## 2022-12-08 RX ADMIN — SODIUM CHLORIDE, POTASSIUM CHLORIDE, SODIUM LACTATE AND CALCIUM CHLORIDE: 600; 310; 30; 20 INJECTION, SOLUTION INTRAVENOUS at 12:56

## 2022-12-08 RX ADMIN — DEXMEDETOMIDINE HYDROCHLORIDE 0.5 MCG/KG/HR: 400 INJECTION INTRAVENOUS at 10:56

## 2022-12-08 RX ADMIN — HYDROMORPHONE HYDROCHLORIDE 0.5 MG: 1 INJECTION, SOLUTION INTRAMUSCULAR; INTRAVENOUS; SUBCUTANEOUS at 14:59

## 2022-12-08 RX ADMIN — DEXAMETHASONE SODIUM PHOSPHATE 10 MG: 10 INJECTION, SOLUTION INTRAMUSCULAR; INTRAVENOUS at 11:04

## 2022-12-08 RX ADMIN — FAMOTIDINE 20 MG: 20 TABLET ORAL at 10:17

## 2022-12-08 RX ADMIN — ROCURONIUM BROMIDE 50 MG: 50 INJECTION, SOLUTION INTRAVENOUS at 10:29

## 2022-12-08 RX ADMIN — Medication 5 MG: at 12:01

## 2022-12-08 RX ADMIN — GABAPENTIN 300 MG: 300 CAPSULE ORAL at 20:51

## 2022-12-08 RX ADMIN — KETAMINE HYDROCHLORIDE 50 MG: 10 INJECTION, SOLUTION INTRAMUSCULAR; INTRAVENOUS at 10:29

## 2022-12-08 RX ADMIN — KETAMINE HYDROCHLORIDE 10 MG: 10 INJECTION, SOLUTION INTRAMUSCULAR; INTRAVENOUS at 12:58

## 2022-12-08 RX ADMIN — PHENYLEPHRINE HYDROCHLORIDE 100 MCG: 10 INJECTION INTRAVENOUS at 11:40

## 2022-12-08 RX ADMIN — Medication 2 G: at 14:20

## 2022-12-08 RX ADMIN — Medication 2 G: at 10:20

## 2022-12-08 RX ADMIN — HYDROMORPHONE HYDROCHLORIDE 0.5 MG: 1 INJECTION, SOLUTION INTRAMUSCULAR; INTRAVENOUS; SUBCUTANEOUS at 11:00

## 2022-12-08 RX ADMIN — ACETAMINOPHEN 975 MG: 325 TABLET ORAL at 09:04

## 2022-12-08 RX ADMIN — PROPOFOL 200 MG: 10 INJECTION, EMULSION INTRAVENOUS at 10:29

## 2022-12-08 RX ADMIN — ROCURONIUM BROMIDE 10 MG: 50 INJECTION, SOLUTION INTRAVENOUS at 13:08

## 2022-12-08 RX ADMIN — GLYCOPYRROLATE 0.2 MG: 0.2 INJECTION, SOLUTION INTRAMUSCULAR; INTRAVENOUS at 10:29

## 2022-12-08 RX ADMIN — FAMOTIDINE 20 MG: 20 TABLET ORAL at 20:51

## 2022-12-08 RX ADMIN — MAGNESIUM SULFATE HEPTAHYDRATE 4 G: 80 INJECTION, SOLUTION INTRAVENOUS at 09:57

## 2022-12-08 RX ADMIN — SUGAMMADEX 200 MG: 100 INJECTION, SOLUTION INTRAVENOUS at 15:11

## 2022-12-08 RX ADMIN — KETAMINE HYDROCHLORIDE 10 MG: 10 INJECTION, SOLUTION INTRAMUSCULAR; INTRAVENOUS at 14:41

## 2022-12-08 RX ADMIN — LIDOCAINE HYDROCHLORIDE 5 ML: 10 INJECTION, SOLUTION INFILTRATION; PERINEURAL at 10:29

## 2022-12-08 RX ADMIN — PROPOFOL 40 MG: 10 INJECTION, EMULSION INTRAVENOUS at 14:59

## 2022-12-08 RX ADMIN — SODIUM CHLORIDE, PRESERVATIVE FREE: 5 INJECTION INTRAVENOUS at 17:53

## 2022-12-08 RX ADMIN — HYDROMORPHONE HYDROCHLORIDE 0.5 MG: 1 INJECTION, SOLUTION INTRAMUSCULAR; INTRAVENOUS; SUBCUTANEOUS at 14:44

## 2022-12-08 RX ADMIN — ONDANSETRON 4 MG: 2 INJECTION INTRAMUSCULAR; INTRAVENOUS at 14:44

## 2022-12-08 RX ADMIN — SENNOSIDES AND DOCUSATE SODIUM 1 TABLET: 8.6; 5 TABLET ORAL at 20:51

## 2022-12-08 ASSESSMENT — ACTIVITIES OF DAILY LIVING (ADL)
ADLS_ACUITY_SCORE: 26
ADLS_ACUITY_SCORE: 26
ADLS_ACUITY_SCORE: 35
ADLS_ACUITY_SCORE: 22
ADLS_ACUITY_SCORE: 22
ADLS_ACUITY_SCORE: 26
ADLS_ACUITY_SCORE: 22
ADLS_ACUITY_SCORE: 22

## 2022-12-08 NOTE — ANESTHESIA PREPROCEDURE EVALUATION
Anesthesia Pre-Procedure Evaluation    Patient: Richard Hickman   MRN: [id number] : 1960        Procedure : Procedure(s):  lumbar 2 to lumbar 3 hemilaminectomies, medial facetectomies, and foraminotomies, lumbar 4 to lumbar 5 laminectomy, medial facetectomy, foraminotomy and bilateral microdiscectomies          Past Medical History:   Diagnosis Date     Essential hypertension      HLD (hyperlipidemia)      Lumbar radiculopathy      Mass of testis       Past Surgical History:   Procedure Laterality Date     APPENDECTOMY        No Known Allergies   Social History     Tobacco Use     Smoking status: Every Day     Packs/day: 0.10     Types: Cigarettes     Start date: 1981     Smokeless tobacco: Never   Substance Use Topics     Alcohol use: No      Wt Readings from Last 1 Encounters:   22 95.3 kg (210 lb)        Anesthesia Evaluation            ROS/MED HX  ENT/Pulmonary:  - neg pulmonary ROS     Neurologic: Comment: Cauda equina syndrome with neurogenic bladder  Lumbar disc herniation  - neg neurologic ROS     Cardiovascular:     (+) hypertension-----    METS/Exercise Tolerance:     Hematologic:  - neg hematologic  ROS     Musculoskeletal:  - neg musculoskeletal ROS     GI/Hepatic:  - neg GI/hepatic ROS     Renal/Genitourinary:  - neg Renal ROS     Endo:  - neg endo ROS     Psychiatric/Substance Use:     (+) psychiatric history anxiety Recreational drug usage: Other (Comment) (severe opioid abuse. On suboxone until 3 months ago).    Infectious Disease:  - neg infectious disease ROS     Malignancy:  - neg malignancy ROS     Other:  - neg other ROS          Physical Exam    Airway  airway exam normal      Mallampati: II       Respiratory Devices and Support         Dental     Comment: Overall poor    (+) upper dentures and lower dentures      Cardiovascular   cardiovascular exam normal       Rhythm and rate: regular and normal     Pulmonary   pulmonary exam normal        breath sounds clear to auscultation            OUTSIDE LABS:  CBC:   Lab Results   Component Value Date    WBC 9.5 12/01/2022    WBC 6.6 02/27/2020    HGB 15.7 12/01/2022    HGB 15.5 02/27/2020    HCT 48.5 12/01/2022    HCT 50.5 02/27/2020     12/01/2022     02/27/2020     BMP:   Lab Results   Component Value Date     12/01/2022     02/27/2020    POTASSIUM 4.1 12/01/2022    POTASSIUM 4.0 02/27/2020    CHLORIDE 104 12/01/2022    CHLORIDE 103 02/27/2020    CO2 26 12/01/2022    CO2 34 (H) 02/27/2020    BUN 10.1 12/01/2022    BUN 9 02/27/2020    CR 0.97 12/01/2022    CR 1.03 02/27/2020    GLC 97 12/01/2022    GLC 99 02/27/2020     COAGS:   Lab Results   Component Value Date    PTT 26 12/01/2022    INR 0.91 12/01/2022     POC: No results found for: BGM, HCG, HCGS  HEPATIC:   Lab Results   Component Value Date    ALBUMIN 4.0 02/27/2020    PROTTOTAL 7.7 02/27/2020    ALT 13 02/27/2020    AST 14 02/27/2020    ALKPHOS 91 02/27/2020    BILITOTAL 1.2 (H) 02/27/2020     OTHER:   Lab Results   Component Value Date    LATRICE 9.7 12/01/2022    MAG 2.0 02/27/2020       Anesthesia Plan    ASA Status:  3      Anesthesia Type: General.     - Airway: ETT   Induction: Intravenous, Propofol.   Maintenance: Balanced.   Techniques and Equipment:     - Lines/Monitors: 2nd IV     Consents    Anesthesia Plan(s) and associated risks, benefits, and realistic alternatives discussed. Questions answered and patient/representative(s) expressed understanding.    - Discussed:     - Discussed with:  Patient      - Extended Intubation/Ventilatory Support Discussed: No.      - Patient is DNR/DNI Status: No    Use of blood products discussed: No .     Postoperative Care    Pain management: IV analgesics, Oral pain medications, Multi-modal analgesia.   PONV prophylaxis: Ondansetron (or other 5HT-3), Dexamethasone or Solumedrol     Comments:    Other Comments: OTF  2 IVs  Will discuss with surgeon if okay to give gabapentin pre op  Aware of patient's request for no  narcotics, but after discussion he understands that narcotics will likely be required for this procedure  Antiemetics  precedex infusion for POP control  Magnesium bayron op infusion  ketamine after induction  Tylenol po pre op  toradol at the end of the case if okay with the surgeon  Local by surgeon at incision site at the end of the case if okay with the surgeon  Possible pain consult            Ector Mazariegos MD

## 2022-12-08 NOTE — INTERVAL H&P NOTE
"I have reviewed the surgical (or preoperative) H&P that is linked to this encounter, and examined the patient. There are no significant changes    Clinical Conditions Present on Arrival:  Clinically Significant Risk Factors Present on Admission                    # Obesity: Estimated body mass index is 30.39 kg/m  as calculated from the following:    Height as of 12/1/22: 5' 10\" (1.778 m).    Weight as of this encounter: 211 lb 12.8 oz (96.1 kg).       "

## 2022-12-08 NOTE — PHARMACY-ADMISSION MEDICATION HISTORY
Pharmacy Note - Admission Medication History   ______________________________________________________________________    Prior To Admission (PTA) med list completed and updated in EMR.       PTA Med List   Medication Sig Last Dose     amLODIPine (NORVASC) 5 MG tablet [AMLODIPINE (NORVASC) 5 MG TABLET] Take 1 tablet (5 mg total) by mouth daily. 12/8/2022 at am     aspirin 81 mg chewable tablet [ASPIRIN 81 MG CHEWABLE TABLET] Chew 81 mg every morning. 12/1/2022     atorvastatin (LIPITOR) 20 MG tablet [ATORVASTATIN (LIPITOR) 20 MG TABLET] Take 1 tablet (20 mg total) by mouth at bedtime. 12/8/2022 at am     cholecalciferol, vitamin D3, 1,000 unit (25 mcg) tablet [CHOLECALCIFEROL, VITAMIN D3, 1,000 UNIT (25 MCG) TABLET] Take 1,000 Units by mouth every morning. 12/1/2022     OMEGA-3/DHA/EPA/FISH OIL (FISH OIL-OMEGA-3 FATTY ACIDS) 300-1,000 mg capsule [OMEGA-3/DHA/EPA/FISH OIL (FISH OIL-OMEGA-3 FATTY ACIDS) 300-1,000 MG CAPSULE] Take 1 capsule (1 g total) by mouth daily. 12/1/2022       Information source(s): Patient and CareEverywhere/[facility]    Method of interview communication: in-person    Patient was asked about OTC/herbal products specifically.  PTA med list reflects this.    Based on the pharmacist's assessment, the PTA med list information appears reliable    Allergies were reviewed, assessed, and updated with the patient.      Patient does not use any multi-dose medications prior to admission.     Thank you for the opportunity to participate in the care of this patient.      Fabiana Salcido RPH     12/8/2022     9:59 AM

## 2022-12-08 NOTE — ANESTHESIA CARE TRANSFER NOTE
Patient: Richard Hickman    Procedure: Procedure(s):  lumbar 2 to lumbar 3 and lumbar 4 to lumbar 5 hemilaminectomies, medial facetectomies, and foraminotomies and lumbar 4 to lumbar 5  bilateral microdiscectomies       Diagnosis: Cauda equina syndrome with neurogenic bladder (H) [G83.4]  Lumbar disc herniation [M51.26]  Spinal stenosis, lumbar region, without neurogenic claudication [M48.061]  Saddle anesthesia [R20.0]  Leg numbness [R20.0]  Diagnosis Additional Information: No value filed.    Anesthesia Type:   General     Note:    Oropharynx: oral airway in place and spontaneously breathing  Level of Consciousness: drowsy  Oxygen Supplementation: face mask  Level of Supplemental Oxygen (L/min / FiO2): 6  Independent Airway: airway patency satisfactory and stable  Dentition: dentition unchanged  Vital Signs Stable: post-procedure vital signs reviewed and stable  Report to RN Given: handoff report given  Patient transferred to: PACU    Handoff Report: Identifed the Patient, Identified the Reponsible Provider, Reviewed the pertinent medical history, Discussed the surgical course, Reviewed Intra-OP anesthesia mangement and issues during anesthesia, Set expectations for post-procedure period and Allowed opportunity for questions and acknowledgement of understanding      Vitals:  Vitals Value Taken Time   BP 90/54 12/08/22 1521   Temp 36.9  C (98.4  F) 12/08/22 1520   Pulse 77 12/08/22 1525   Resp 16 12/08/22 1525   SpO2 96 % 12/08/22 1525   Vitals shown include unvalidated device data.    Electronically Signed By: LINNEA Babb CRNA  December 8, 2022  3:26 PM

## 2022-12-08 NOTE — CONSULTS
[facility] ACUTE PAIN SERVICE    ([facility], [facility], [facility])   Consult Note    Date of Admission:  12/8/2022  Date of Consult: 12/08/22    Physician requesting consult: Bere Francisco CNP  Reason for consult: hx of opioid use disorder, wanting to avoid narcotics or pcp recommended saboxone, having surgery today     Assessment/Plan:     Richard Hickman is a 62 year old male who was admitted on 12/8/2022.  Day of Surgery.  Pain Service is asked to see the patient for assistance with pain management, patient has history of opioid use disorder wanting to avoid narcotics or pcp recommended Suboxone, surgery today.  Admitted for planned procedure. History of HTN, HLD, tobacco use, opioid use disorder.  Patient had been on Suboxone 12 mg daily up until about 3 months ago.  At that time he felt he didn't need to continue with it.  He had not eunice having cravings, etc.  At home he takes THC gummies, [place] chestnut roots and burdock root for pain control.  He is also on daily ASA.    Patient was lifting pallets at work and strained his back.  He went to ED in [place] due to back pain, numbnss and urinary incontinence.  MRI of lumbar spine demonstrated severe spinal stenosis wit effacement of the thecal sac.         PLAN:   1) Pain is consistent with acute post operative pain with history of spinal stenosis with cauda equina syndrome with neurogenic bladder, large disc herniation at L4-5.    The patient's home MME was 0 mg daily. Agree with maximizing non opioid pain management strategies.    Appreciate recommendations by patient Primary Provider per H&P for suboxone dosing if needed.   suboxone 4 mg bid for first day then increase to 4 mg tid .   2)Multimodal Medication Therapy  Topical: lidocaine patch or cream  NSAID'S: avoid for now  Muscle Relaxants: Robaxin 500 mg every 6 hours prn ,  gabapentin 300 mg bid     Adjuvants: vistaril 25  mg every 6 hours prn   Antidepressants/anxiolytics:  none  Opioids: none for now  IV Pain medication: avoid  3)Non-medication interventions  Pharmacy consult- appreciate recommendations   Acupuncture consult- as available  Integrative consult - called referral to [phone]   4)Constipation Prophylaxis  Daily stool softener/laxative   5) Follow up   -Opioid prescriber has been none  Buprenorphine prescribed by Pee Aguilar PA-C [facility].  -Discharge Recommendations - We recommend prescribing the following at the time of discharge: TBD         History of Present Illness (HPI):       Richard Hickman is a 62 year old male with acute post operative pain.   The pain is reported to be acute lower back pain after lifting pallets at his work.  Patient does had similar episodes of acute onset of back pain/strain.    Patient has history of Substance Use Disorder and has been stable over recent months without craving opioids.  He is interested in non opioid pain management for his post operative pain management.        [place]  pulled from system on 12/08/22.   Last refill on 10/05/22. This indicated that patient was prescribed Buprenorphine from July to Oct. 5, 2022 last prescribed Buprenorphine-Naloxone 8-2mg tablets 30 for 30 days  9/3022 buprenorphine -Naloxone 2-0.5 mg tablets 60 for 30 days  All prescriptions from Pee Aguilar PA-C [facility].    .       Medical History  Patient Active Problem List    Diagnosis Date Noted     Essential hypertension      Priority: Medium     Severe opioid use disorder (H) 02/27/2020     Priority: Medium     Anxiety state, unspecified 06/27/2016     Priority: Medium     Replacement Utility updated for latest IMO load         Tobacco use disorder 06/26/2016     Priority: Medium     Drug-induced erectile dysfunction 06/26/2016     Priority: Medium     Panic attacks 06/26/2016     Priority: Medium     Tinea pedis      Priority: Medium     Rhinorrhea      Priority: Medium     Weight loss      Priority: Medium     Opioid use disorder, severe, on  maintenance therapy (H) 03/04/2016     Priority: Medium        Surgical History  He  has a past surgical history that includes appendectomy.     Past Surgical History:   Procedure Laterality Date     APPENDECTOMY         Allergies  No Known Allergies    Prior to Admission Medications   Medications Prior to Admission   Medication Sig Dispense Refill Last Dose     amLODIPine (NORVASC) 5 MG tablet [AMLODIPINE (NORVASC) 5 MG TABLET] Take 1 tablet (5 mg total) by mouth daily. 30 tablet 0 12/8/2022 at am     aspirin 81 mg chewable tablet [ASPIRIN 81 MG CHEWABLE TABLET] Chew 81 mg every morning.   12/1/2022     atorvastatin (LIPITOR) 20 MG tablet [ATORVASTATIN (LIPITOR) 20 MG TABLET] Take 1 tablet (20 mg total) by mouth at bedtime. 30 tablet 0 12/8/2022 at am     cholecalciferol, vitamin D3, 1,000 unit (25 mcg) tablet [CHOLECALCIFEROL, VITAMIN D3, 1,000 UNIT (25 MCG) TABLET] Take 1,000 Units by mouth every morning.   12/1/2022     OMEGA-3/DHA/EPA/FISH OIL (FISH OIL-OMEGA-3 FATTY ACIDS) 300-1,000 mg capsule [OMEGA-3/DHA/EPA/FISH OIL (FISH OIL-OMEGA-3 FATTY ACIDS) 300-1,000 MG CAPSULE] Take 1 capsule (1 g total) by mouth daily. 30 capsule 0 12/1/2022       Social History  Reviewed, and he  reports that he has been smoking cigarettes. He started smoking about 41 years ago. He has been smoking an average of .1 packs per day. He has never used smokeless tobacco. He reports that he does not drink alcohol and does not use drugs.  Social History     Tobacco Use     Smoking status: Every Day     Packs/day: 0.10     Types: Cigarettes     Start date: 6/13/1981     Smokeless tobacco: Never   Substance Use Topics     Alcohol use: No       Family History  Reviewed, and family history is not on file.    Review of Systems  Complete ROS reviewed, unless noted, all other systems reviewed and found to be negative.        Objective:     Physical Exam:  /87 (BP Location: Right arm)   Pulse 74   Temp 98.2  F (36.8  C) (Oral)   Resp 18    Wt 96.1 kg (211 lb 12.8 oz)   SpO2 98%   BMI 30.39 kg/m    Weight:   Weight change:   Body mass index is 30.39 kg/m .      General Appearance:  Dosing, awakens, mouth is dry, minimal pain, wife at bedside   Head:  Normocephalic, without obvious abnormality, atraumatic   Eyes:  PERRL, conjunctiva/corneas clear, EOM's intact   ENT/Throat: Lips, mucosa, and tongue normal; teeth and gums normal   Lymph/Neck: Supple, symmetrical, trachea midline   Lungs:   respirations unlabored, oxygen with face mask   Chest Wall:  No tenderness or deformity   Cardiovascular/Heart:  Regular rate and rhythm   Abdomen:   Soft, non-tender   Musculoskeletal: Rests in bed   Skin: Skin color, texture, turgor normal, no rashes or lesions   Neurologic: Alert and oriented X 3, Moves all 4 extremities       Psych: Affect is limited range            Imaging Reviewed  XR Lumbar Spine G/E 4 Views    Result Date: 11/17/2022  LUMBAR SPINE FOUR OR MORE VIEWS  11/17/2022 12:59 PM COMPARISON: Lumbar spine MRI 11/3/2022 HISTORY: Back pain     IMPRESSION: 5 lumbar type vertebrae. Reversal of the normal lumbar lordosis centered at L3 again noted. Alignment otherwise normal. Alignment does not change appreciably in the flexed or extended positions. Vertebral body heights normal. No fractures. Facet arthropathy at L4-L5 and L5-S1. Degenerative endplate spurring at L4-L5. WILMAR BHANDARI MD   SYSTEM ID:  [id number]      Labs Reviewed   Results for orders placed or performed during the hospital encounter of 12/08/22   Glucose by meter     Status: Normal   Result Value Ref Range    GLUCOSE BY METER POCT 90 70 - 99 mg/dL          Total time spent 65 minutes with greater than 50% in consultation, education and coordination of care.     Also discussed with RN    Thank you for this consultation.      Chika YARBROUGH, CNS-BC, DNP  Acute Care Pain Management Program  [facility] ([place], [place], [place])   With questions call [phone]  Preference if for [facility]  Gabino Herg  Click HERE to page Debra

## 2022-12-08 NOTE — PROGRESS NOTES
Neurosurgery Note:    Pain team consulted post-op, patient would like to avoid narcotics if possible. I did order valium but can be adjusted as needed. Ideally, would avoid NSAIDs for at least the first 48 hours post op.     Patient also has a hx of urinary retention pre-op, may need bruno to be replaced and sent home with for outpatient voiding trial.     Bere Francisco CNP  [facility] Neurosurgery  O: [phone]  P: [phone]

## 2022-12-08 NOTE — BRIEF OP NOTE
[facility] Brief Operative Note    Pre-operative diagnosis: Cauda equina syndrome with neurogenic bladder (H) [G83.4]  Lumbar disc herniation [M51.26]  Spinal stenosis, lumbar region, without neurogenic claudication [M48.061]  Saddle anesthesia [R20.0]  Leg numbness [R20.0]   Post-operative diagnosis same   Procedure: Procedure(s):  lumbar 2 to lumbar 3 and lumbar 4 to lumbar 5 hemilaminectomies, medial facetectomies, and foraminotomies and lumbar 4 to lumbar 5  bilateral microdiscectomies   Surgeon(s): Surgeon(s) and Role:     * Mallory Natarajan MD - Primary     * Bere Francisco NP - Assisting   Estimated blood loss: 100 mL    Specimens: * No specimens in log *   Findings: Large disc herniation at lumbar 4-5 and stenosis at lumbar 2-3  * No implants in log *

## 2022-12-08 NOTE — ANESTHESIA PROCEDURE NOTES
Airway       Patient location during procedure: OR       Procedure Start/Stop Times: 12/8/2022 10:33 AM  Staff -        CRNA: Katia Duke APRN CRNA       Performed By: CRNA  Consent for Airway        Urgency: elective  Indications and Patient Condition       Indications for airway management: bayron-procedural       Induction type:intravenous       Mask difficulty assessment: 1 - vent by mask    Final Airway Details       Final airway type: endotracheal airway       Successful airway: ETT - single  Endotracheal Airway Details        ETT size (mm): 8.0       Cuffed: yes       Successful intubation technique: direct laryngoscopy       DL Blade Type:  2       Grade View of Cords: 1       Adjucts: stylet       Position: Right       Measured from: lips       Secured at (cm): 22    Post intubation assessment        Placement verified by: capnometry, equal breath sounds and chest rise        Number of attempts at approach: 1       Number of other approaches attempted: 0       Secured with: silk tape       Ease of procedure: easy       Dentition: Intact and Unchanged    Medication(s) Administered   Medication Administration Time: 12/8/2022 10:33 AM

## 2022-12-09 ENCOUNTER — APPOINTMENT (OUTPATIENT)
Dept: OCCUPATIONAL THERAPY | Facility: HOSPITAL | Age: 62
DRG: 030 | End: 2022-12-09
Attending: NURSE PRACTITIONER
Payer: OTHER MISCELLANEOUS

## 2022-12-09 ENCOUNTER — APPOINTMENT (OUTPATIENT)
Dept: PHYSICAL THERAPY | Facility: HOSPITAL | Age: 62
DRG: 030 | End: 2022-12-09
Attending: NURSE PRACTITIONER
Payer: OTHER MISCELLANEOUS

## 2022-12-09 VITALS
HEART RATE: 69 BPM | OXYGEN SATURATION: 97 % | RESPIRATION RATE: 20 BRPM | TEMPERATURE: 98 F | DIASTOLIC BLOOD PRESSURE: 69 MMHG | WEIGHT: 211.8 LBS | BODY MASS INDEX: 30.39 KG/M2 | SYSTOLIC BLOOD PRESSURE: 131 MMHG

## 2022-12-09 PROCEDURE — 97165 OT EVAL LOW COMPLEX 30 MIN: CPT | Mod: GO

## 2022-12-09 PROCEDURE — 97116 GAIT TRAINING THERAPY: CPT | Mod: GP

## 2022-12-09 PROCEDURE — 97161 PT EVAL LOW COMPLEX 20 MIN: CPT | Mod: GP

## 2022-12-09 PROCEDURE — 258N000003 HC RX IP 258 OP 636: Performed by: NURSE PRACTITIONER

## 2022-12-09 PROCEDURE — 120N000001 HC R&B MED SURG/OB

## 2022-12-09 PROCEDURE — 250N000013 HC RX MED GY IP 250 OP 250 PS 637: Performed by: NURSE PRACTITIONER

## 2022-12-09 PROCEDURE — 250N000013 HC RX MED GY IP 250 OP 250 PS 637: Performed by: CLINICAL NURSE SPECIALIST

## 2022-12-09 PROCEDURE — 250N000011 HC RX IP 250 OP 636: Performed by: NURSE PRACTITIONER

## 2022-12-09 PROCEDURE — 97535 SELF CARE MNGMENT TRAINING: CPT | Mod: GO

## 2022-12-09 RX ADMIN — ACETAMINOPHEN 975 MG: 325 TABLET ORAL at 16:23

## 2022-12-09 RX ADMIN — GABAPENTIN 300 MG: 300 CAPSULE ORAL at 08:05

## 2022-12-09 RX ADMIN — FAMOTIDINE 20 MG: 20 TABLET ORAL at 20:19

## 2022-12-09 RX ADMIN — Medication 500 MG: at 08:05

## 2022-12-09 RX ADMIN — SENNOSIDES AND DOCUSATE SODIUM 1 TABLET: 8.6; 5 TABLET ORAL at 08:05

## 2022-12-09 RX ADMIN — DEXAMETHASONE SODIUM PHOSPHATE 3 MG: 4 INJECTION, SOLUTION INTRA-ARTICULAR; INTRALESIONAL; INTRAMUSCULAR; INTRAVENOUS; SOFT TISSUE at 08:07

## 2022-12-09 RX ADMIN — ATORVASTATIN CALCIUM 20 MG: 10 TABLET, FILM COATED ORAL at 20:18

## 2022-12-09 RX ADMIN — Medication 1000 UNITS: at 08:05

## 2022-12-09 RX ADMIN — SENNOSIDES AND DOCUSATE SODIUM 1 TABLET: 8.6; 5 TABLET ORAL at 20:19

## 2022-12-09 RX ADMIN — ACETAMINOPHEN 975 MG: 325 TABLET ORAL at 08:04

## 2022-12-09 RX ADMIN — AMLODIPINE BESYLATE 5 MG: 5 TABLET ORAL at 08:05

## 2022-12-09 RX ADMIN — ACETAMINOPHEN 975 MG: 325 TABLET ORAL at 23:52

## 2022-12-09 RX ADMIN — FAMOTIDINE 20 MG: 20 TABLET ORAL at 08:05

## 2022-12-09 RX ADMIN — ACETAMINOPHEN 975 MG: 325 TABLET ORAL at 01:35

## 2022-12-09 RX ADMIN — POLYETHYLENE GLYCOL 3350 17 G: 17 POWDER, FOR SOLUTION ORAL at 08:05

## 2022-12-09 RX ADMIN — THERA TABS 1 TABLET: TAB at 08:05

## 2022-12-09 RX ADMIN — SODIUM CHLORIDE, PRESERVATIVE FREE: 5 INJECTION INTRAVENOUS at 07:43

## 2022-12-09 RX ADMIN — Medication 500 MG: at 21:59

## 2022-12-09 ASSESSMENT — ACTIVITIES OF DAILY LIVING (ADL)
ADLS_ACUITY_SCORE: 26
ADLS_ACUITY_SCORE: 24
ADLS_ACUITY_SCORE: 26
ADLS_ACUITY_SCORE: 24
ADLS_ACUITY_SCORE: 24
ADLS_ACUITY_SCORE: 26
ADLS_ACUITY_SCORE: 24
ADLS_ACUITY_SCORE: 26

## 2022-12-09 NOTE — ANESTHESIA POSTPROCEDURE EVALUATION
Patient: Richard Hickman    Procedure: Procedure(s):  lumbar 2 to lumbar 3 and lumbar 4 to lumbar 5 hemilaminectomies, medial facetectomies, and foraminotomies and lumbar 4 to lumbar 5  bilateral microdiscectomies       Anesthesia Type:  General    Note:  Disposition: Inpatient   Postop Pain Control: Uneventful            Sign Out: Well controlled pain   PONV: No   Neuro/Psych: Uneventful            Sign Out: Acceptable/Baseline neuro status   Airway/Respiratory: Uneventful            Sign Out: Acceptable/Baseline resp. status   CV/Hemodynamics: Uneventful            Sign Out: Acceptable CV status; No obvious hypovolemia; No obvious fluid overload   Other NRE:    DID A NON-ROUTINE EVENT OCCUR?            Last vitals:  Vitals Value Taken Time   /69 12/08/22 1645   Temp 98.1  F (36.7  C) 12/08/22 1630   Pulse 76 12/08/22 1646   Resp 13 12/08/22 1645   SpO2 97 % 12/08/22 1646   Vitals shown include unvalidated device data.    Electronically Signed By: Ector Mazariegos MD  December 9, 2022  11:30 AM

## 2022-12-09 NOTE — PLAN OF CARE
Occupational Therapy Discharge Summary    Reason for therapy discharge:    All goals and outcomes met, no further needs identified.    Progress towards therapy goal(s). See goals on Care Plan in [facility] electronic health record for goal details.  Goals met    Therapy recommendation(s):    No further therapy is recommended.     JOVITA Middleton, OTR/L, 12/9/2022, 1:24 PM      Goal Outcome Evaluation:

## 2022-12-09 NOTE — PROGRESS NOTES
"   12/09/22 0830   Appointment Info   Signing Clinician's Name / Credentials (PT) Jena Bailey PT   Rehab Comments (PT) pt very willing to work with PT, has good understanding of precautions and post op recovery   Living Environment   People in Home spouse   Current Living Arrangements house   Home Accessibility stairs to enter home   Number of Stairs, Main Entrance 3   Transportation Anticipated family or friend will provide   Living Environment Comments one level home   Self-Care   Usual Activity Tolerance excellent   Current Activity Tolerance moderate   Regular Exercise Yes   Equipment Currently Used at Home none   Activity/Exercise/Self-Care Comment prior to back injury in October 2022, pt states he was indep all activies, in very good shape   General Information   Onset of Illness/Injury or Date of Surgery 12/08/22   Referring Physician SURYA Francisco NP   Patient/Family Therapy Goals Statement (PT) return home   Pertinent History of Current Problem (include personal factors and/or comorbidities that impact the POC) lumbar 2 to lumbar 3 and lumbar 4 to lumbar 5 hemilaminectomies, medial facetectomies, and foraminotomies and lumbar 4 to lumbar 5  bilateral microdiscectomies   Existing Precautions/Restrictions spinal   General Observations pt already up in chair, discussed/demo log roll technique for sup<>sit.   Cognition   Affect/Mental Status (Cognition) WNL   Orientation Status (Cognition) oriented x 4   Follows Commands (Cognition) WNL   Pain Assessment   Patient Currently in Pain Yes, see Vital Sign flowsheet  (just \"sore\")   Range of Motion (ROM)   ROM Comment LE ROM WNL   Strength (Manual Muscle Testing)   Strength Comments LE sttrength WFL   Transfers   Transfers sit-stand transfer   Sit-Stand Transfer   Sit-Stand [place] (Transfers) supervision;verbal cues   Assistive Device (Sit-Stand Transfers) other (see comments)  (none, pushes up from chair)   Comment, (Sit-Stand Transfer) slower to come to stand, " but done safely and without AD   Gait/Stairs (Locomotion)   [place] Level (Gait) contact guard   Assistive Device (Gait) other (see comments)  (pushine IV pole)   Distance in Feet 50   Distance in Feet (Gait) 200' x2   Pattern (Gait) step-through   Deviations/Abnormal Patterns (Gait) veronica decreased   Clinical Impression   Criteria for Skilled Therapeutic Intervention Yes, treatment indicated   PT Diagnosis (PT) impaired funactional mobility   Influenced by the following impairments post surgical precautions, fatigue/soreness   Functional limitations due to impairments transfers, gt   Clinical Presentation (PT Evaluation Complexity) Stable/Uncomplicated   Clinical Presentation Rationale presents as diagnosed   Clinical Decision Making (Complexity) low complexity   Planned Therapy Interventions (PT) gait training;stair training   Anticipated Equipment Needs at Discharge (PT)   (none)   Risk & Benefits of therapy have been explained care plan/treatment goals reviewed;patient;spouse/significant other   PT Total Evaluation Time   PT Eval, Low Complexity Minutes (25734) 10   Plan of Care Review   Plan of Care Reviewed With patient   Physical Therapy Goals   PT Frequency One time eval and treatment only   PT Predicted Duration/Target Date for Goal Attainment 12/09/22   PT Goals Transfers;Gait;Stairs   PT: Transfers Supervision/stand-by assist;Sit to/from stand   PT: Gait Supervision/stand-by assist;Within precautions;Greater than 200 feet   PT: Stairs 4 stairs;Supervision/stand-by assist  (one rail)   Interventions   Interventions Quick Adds Gait Training   Gait Training   Gait Training Minutes (43200) 15   Symptoms Noted During/After Treatment (Gait Training) none   Treatment Detail/Skilled Intervention steady pace either pushing IV pole or without, no LOB, posture is good.  4 stair steps x2, reciprocal going up, non reciprocal coming back down.   [place] Level (Gait Training) other (see comments)  (dis tant  supervision)   Physical Assistance Level (Gait Training) supervision   Weight Bearing (Gait Training) full weight-bearing   Assistive Device (Gait Training) other (see comments)  (none, or pushing IV pole)   Pattern Analysis (Gait Training) swing-through gait   Stair Railings present on right side   Physical Assist/Nonphysical Assist (Stairs) supervision   Level of [place] (Stairs) other (see comments)  (supervision)   PT Discharge Planning   PT Plan most likely home later today, no further PT needs at this time   PT Discharge Recommendation (DC Rec) home   PT Brief overview of current status SBA/indep all mobility, gt 200' without AD and 4 steps with 1 rail - all with distnat supervision   Total Session Time   Timed Code Treatment Minutes 15   Total Session Time (sum of timed and untimed services) 25

## 2022-12-09 NOTE — PLAN OF CARE
Problem: Pain Acute  Goal: Optimal Pain Control and Function  Outcome: Progressing   Goal Outcome Evaluation:       pt is alert and oriented x 4. Had schedule tylenol for mild pain rated 4/10, denied any any other discomfort. Uses a urinal at the bed side. RUBINA in place and intact. Will continue to monitor pt

## 2022-12-09 NOTE — OP NOTE
NEUROSURGERY OPERATIVE REPORT    DATE OF SERVICE: 12/8/2022    PREOPERATIVE DIAGNOSES:  Cauda equina syndrome with neurogenic bladder   Lumbar disc herniation   Spinal stenosis, lumbar region, without neurogenic claudication   Saddle anesthesia   Leg numbness    POSTOPERATIVE DIAGNOSES:  Same    PROCEDURES:  1.  Lumbar 2- lumbar 3 and lumbar 4 - lumbar 5 hemilaminectomies, medial facetectomies, and foraminotomies   2.  Lumbar 4 to lumbar 5 bilateral microdiscectomies  3.  Use of operating room microscope.    SURGEON:  Mallory Natarajan MD    ASSISTANT: Bere Francisco CNP     INDICATIONS:  Richard Hickman is a 61 yo male who presents with symptoms of cauda equina syndrome including leg weakness, numbness, saddle anesthesia and urinary retention. Lumbar xray shows reversal of normal lordosis centered at L3 without instability. MRI of his lumbar spine shows severe canal stenosis at L2-3 and L4-5. L2-3 he has ligamentous and facet hypertrophy in setting of a broad based disc contributing to his severe stenosis and at lumbar 4-5 he has a large disc herniation very severely impinging his nerve roots. Recommend urgent decompression including L2-3 and L4-5 midline sparing decompression with hemilaminectomies, medial facetectomies and foraminotomies and at lumbar 4-5 bilateral microdiscectomies. Risks and benefits of surgery discussed including but not limited to infection, hematoma, nerve damage including paralysis, post op radiculitis, durotomy, residual symptoms, risks associated with the use of general anesthesia, blood clots in the lungs or legs. We discussed symptoms have been ongoing some time so recovery may take a while and he may have residual deficits. The patient and his wife appeared to understand and agreed to proceed.     PROCEDURE:  After obtaining informed consent, the patient was brought to the operating room with pneumatic stockings in place.  IV antibiotics was administered.  He was intubated under  general  endotracheal anesthesia.  He was turned into the radiolucent laminectomy frame with all pressure points well padded after a Eugene catheter was placed.   He was prepped and draped in the usual sterile fashion.  A preincisional infiltration of local anesthetic was performed along the midline and the incision was opened sharply and extended down to the fascia.  The fascia was opened in one layer with monopolar electrocautery.  A subperiosteal dissection was undertaken to expose the lamina at lumbar 2-3 and lumbar 4-5.   We verified levels with a lateral x-ray.      Once levels were verified, we then proceeded to remove the inferior portion of the lamina of lumbar 2 and the  superior portion of the lamina of lumbar 3 bilaterally with a Midas drill and Kerrison rongeurs. We drilled down to the ligamentum elevated the ligamentum from the underlying thecal sac and removed it with a combination of Kerrisons and curettes.We brought in the operating room microscope for this and all microdissection.  We next drilled a partial medial facetectomy and foraminotomies opening up the lateral recess and expose the underlying impinged nerve root.     We then proceeded to the lumbar 4-5 level. We again bilaterally removed the inferior portion of the lamina of lumbar 4 and the superior portion of the lamina of lumbar 5 bilatearlly with a Midas drill and Kerrison rongeur.   We drilled down to the ligamentum elevated the ligamentum from the underlying thecal sac and removed it with a combination of Kerrisons and curettes.  We next drilled a partial medial facetectomy  and foraminotomies opening up the lateral recess and expose the underlying impinged nerve root. Of note the disc herniation was larger left>right and had significant displacement of his nerves.  We exposed the annulus bilaterally and identified the nerves and cut the annulus as needed to evacuate the disc bulge to release impingement on the nerve.  We used a  combination of pituitaries and curettes to remove sufficient disc so as to decompress the nerve. Given the partial calcification and size of the disc this procedure took longer then a standard discectomy. We used varying sizes of the back biting 90 degree curettes to remove the most central portion of the disc. We verified bilaterally with  blunt hooks under the thecal sac and in the foramen to there was no further disc material to milk out into the dissection site.  Once the nerves were completely decompressed we used again ball tip probe to verify no further pressure either in the canal or in the foramina.     An assistant was needed for positioning, retraction and closure.     We then proceeded to copiously irrigate the incision with antibiotic-containing saline and achieved hemostasis.  The incision was closed in layers with interrupted 0 Vicryl to approximate the muscle and fascia, inverted 2-0 Vicryl to approximate the subcutaneous tissues and staples to approximate the skin edges.  Sponge and needle counts were correct prior to closure x2.  Sterile dressings were placed.      Patient tolerated the procedure well, was taken to the recovery room where he was extubated and found to be at his most recent neurological baseline with no new deficits appreciated.    Estimated blood loss:100 ml    Specimens: none    Findings:  Large disc herniation at lumbar 4-5 and stenosis at lumbar 2-3        Mallory Natarajan MD    Cc: No Ref-Primary, Physician

## 2022-12-09 NOTE — PLAN OF CARE
Goal Outcome Evaluation:    Physical Therapy Discharge Summary    Reason for therapy discharge:    All goals and outcomes met, no further needs identified.    Progress towards therapy goal(s). See goals on Care Plan in [facility] electronic health record for goal details.  Goals met    Therapy recommendation(s):    No further therapy is recommended.

## 2022-12-09 NOTE — PROGRESS NOTES
Occupational Therapy      12/09/22 1030   Appointment Info   Signing Clinician's Name / Credentials (OT) Evelyn Us OTR/L   Living Environment   People in Home spouse   Current Living Arrangements house   Home Accessibility stairs to enter home   Number of Stairs, Main Entrance 3   Transportation Anticipated family or friend will provide   Living Environment Comments Tub/shower w/ GB and shower   Self-Care   Usual Activity Tolerance excellent   Current Activity Tolerance good   Regular Exercise No   Equipment Currently Used at Home none   Fall history within last six months no   Activity/Exercise/Self-Care Comment Ind. w/ ADL tasks prior to back injury Oct.2022.   General Information   Onset of Illness/Injury or Date of Surgery 12/08/22   Referring Physician Mallory Teresa MD   Additional Occupational Profile Info/Pertinent History of Current Problem 62 year old male POD#1 L2-3 and L4-5 lumbar hemilaminectomies, medical facetectomies, and foraminotomies and L4-5 bilateral microdiscectomy with Dr Natarajan.   Existing Precautions/Restrictions spinal   Cognitive Status Examination   Orientation Status orientation to person, place and time   Range of Motion Comprehensive   General Range of Motion no range of motion deficits identified   Bed Mobility   Bed Mobility supine-sit;sit-supine   Supine-Sit [place] (Bed Mobility) verbal cues;supervision   Sit-Supine [place] (Bed Mobility) verbal cues   Comment (Bed Mobility) Log roll tech   Transfers   Transfers sit-stand transfer   Sit-Stand Transfer   Sit-Stand [place] (Transfers) supervision;verbal cues   Balance   Balance Assessment no deficits were identified   Clinical Impression   Criteria for Skilled Therapeutic Interventions Met (OT) Evaluation only   OT Problem List-Impairments impacting ADL problems related to;activity tolerance impaired;mobility   Assessment of Occupational Performance 1-3 Performance Deficits   Identified Performance Deficits  LB dressing, tub/shower trnf   Planned Therapy Interventions (OT) ADL retraining   Clinical Decision Making Complexity (OT) low complexity   Risk & Benefits of therapy have been explained evaluation/treatment results reviewed;patient   OT Total Evaluation Time   OT Eval, Low Complexity Minutes (41144) 8   OT Goals   Therapy Frequency (OT) One time eval and treatment   OT Predicted Duration/Target Date for Goal Attainment 12/08/22   OT Goals Lower Body Dressing;Bed Mobility;Transfers   OT: Lower Body Dressing Modified independent;within precautions   OT: Bed Mobility Modified independent;supine to/from sitting;within precautions   OT: Transfer Modified independent;with assistive device;within precautions   Interventions   Interventions Quick Adds Self-Care/Home Management   Self-Care/Home Management   Self-Care/Home Mgmt/ADL, Compensatory, Meal Prep Minutes (99993) 23   Symptoms Noted During/After Treatment (Meal Preparation/Planning Training) none   Treatment Detail/Skilled Intervention Pt provided w/ educational handouts and trained in precautions following back surgery, Bed mobility: Sup<>EOB verbal cues for use of Log roll no c/o pain, Pt unable to demo fig. 4 tech seated but able to do it in standing- Pt spouse present and will be assisting w/ LB dressing upon d/c home. STS toileting mod.I w/ fixed GB no LB, pt ambualted ~185ft w/ Mod.I no LOB noted no device, pt demo tub/shower trnf w/ fixed GB w/ supervision pt spouse to assist upon d/c- pt ed/trained in use of AE such as shower chair- pt spouse to obtain upon d/c.  No further skilled OT needs, pt declined LB dressing AE as spouse will be present to assist.   OT Discharge Planning   OT Plan d/c OT   OT Discharge Recommendation (DC Rec) home with assist   OT Rationale for DC Rec Pt moving well w/o device w/ Mod.I no LOB, pt following/maintaining spinal precautions. Pt spouse to assist as needed upon d/c   OT Brief overview of current status Mod.I w/o device    Total Session Time   Timed Code Treatment Minutes 23   Total Session Time (sum of timed and untimed services) 31

## 2022-12-09 NOTE — PLAN OF CARE
Problem: Pain Acute  Goal: Optimal Pain Control and Function  Intervention: Prevent or Manage Pain  Recent Flowsheet Documentation  Taken 12/8/2022 2200 by Nohemi Hernandez, RN  Medication Review/Management: medications reviewed     Problem: Mobility Impairment  Goal: Optimal Mobility  Intervention: Optimize Mobility  Recent Flowsheet Documentation  Taken 12/8/2022 1751 by Nohemi Hernandez, GISSEL  Positioning/Transfer Devices: pillows   Goal Outcome Evaluation:       Patient spent a good shift. Alert and oriented x 4. Patient denies pain and discomfort. Patient ambulated to the bathroom to void with assist of 1 and a walker. Pvr 51 ml. Patient has 2 J.P drain. 40 ml output from the left J.P and 15 ml from the right.

## 2022-12-09 NOTE — PROGRESS NOTES
[facility] ACUTE PAIN SERVICE       Assessment/Plan:     Richard Hickman is a 62 year old male who was admitted on 12/8/2022.  Day of Surgery.  Pain Service is asked to see the patient for assistance with pain management, patient has history of opioid use disorder wanting to avoid narcotics or pcp recommended Suboxone, surgery yesterday History of HTN, HLD, tobacco use, opioid use disorder.  Patient had been on Suboxone 12 mg daily up until about 3 months ago.  At that time he felt he didn't need to continue with it.  He had not eunice having cravings, etc.  At home he takes THC gummies, [place] chestnut roots and burdock root for pain control.  He is also on daily ASA.      Pain overnight rated as mild. He is moving well in bed when I interview him. Reports pain is 'ok', feels good about goal to not use opioids. Not sedated or feeling ill effects of anesthesia. No changes today.    Addendum 1500: patient doing quite well, PMT will sign off, Discharge recs below. NSAID recs per surgery (in their note today)  Thank you for this consult.    PLAN:   1) Pain is consistent with acute post operative pain with history of spinal stenosis with cauda equina syndrome with neurogenic bladder, large disc herniation at L4-5.    The patient's home MME was 0 mg daily. Agree with maximizing non opioid pain management strategies.    Appreciate recommendations by patient Primary Provider per H&P for suboxone dosing if needed.   suboxone 4 mg bid for first day then increase to 4 mg tid .   2)Multimodal Medication Therapy  Topical: lidocaine patch or cream  NSAID'S: avoid for now  Muscle Relaxants: Robaxin 500 mg every 6 hours prn ,  gabapentin 300 mg bid     Adjuvants: vistaril 25  mg every 6 hours prn   Antidepressants/anxiolytics: none  Opioids: none for now  IV Pain medication: avoid  3)Non-medication interventions  Pharmacy consult- appreciate recommendations   Acupuncture consult- as available  Integrative consult - called  referral to [phone]   4)Constipation Prophylaxis  Daily stool softener/laxative   5) Follow up   -Opioid prescriber has been none  Buprenorphine prescribed by Pee Aguilar PA-[facility].  -Discharge Recommendations - We recommend prescribing the following at the time of discharge: APAP, lidocaine topically if he desires. , NSAID if appropriate per neurosurgery.    Shannon Victoria, PHarmD  Acute Care Pain Management Program  [facility] ([facility])   With questions call [phone]  Preference if for [employer] Paging - [place]  Click HERE to page Debra

## 2022-12-09 NOTE — PLAN OF CARE
Problem: Pain Acute  Goal: Optimal Pain Control and Function  Outcome: Progressing  Intervention: Prevent or Manage Pain  Recent Flowsheet Documentation  Taken 12/9/2022 1100 by Gio Martinez RN  Medication Review/Management: medications reviewed   Goal Outcome Evaluation:      Pt was up in chair, ambulated in room, and tolerated well. Pt denied pain, RUBINA drain draining serious fluid. Surgical site intact, no drainage from incision site and denied any discomfort. Pt requesting needs appropriately and spouse at bedside

## 2022-12-09 NOTE — PROGRESS NOTES
"Neurosurgery Post OP:  December 9, 2022    A/P: Roberto Hickman is a 62 year old male POD#1 L2-3 and L4-5 lumbar hemilaminectomies, medical facetectomies, and foraminotomies and L4-5 bilateral microdiscectomy with Dr Natarajan. Patient presented with saddle anesthesia, leg numbness, pain.     Roberto feels amazing. Sensation to legs and groin returned. Has incisional back pain but essentially \"no pain\". Discussed post op cares (smoking cessation, resuming his home remedies 3-5 days post op) and potential for discharge later today. Discontinued dexamethasone and gabapentin. Patient prefers no opioids and limited medications. Tylenol, muscle relaxer and NSAID in 3-5 days can be utilized as well as heat or ice. Monitor drain output through dayshift and possible discharge later today. Stop IV fluids. Need two more post void residual. First 190ml. Wife at bedside. Discussed with Dr Natarajan.         HPI: Presented 12/1/2022 with signs of cauda equina syndrome with leg weakness, numbness, saddle anesthesia and urinary retention. Able to have erection but no penile sensation. Unable to walk. Crawling around his home. Symptoms started 11/1/2022. MRI severe canal stenosis L2-3, L4-5. L2-3 ligamentous and facet hypertrophy in setting of a broad based disc contributing to severe stenosis. L4-5 large disc herniation severely impinging nerve roots. Recovered from opioid addiction. Was on suboxone up until 3 months ago.     Physical Exam  /74 (BP Location: Right arm)   Pulse 73   Temp 98  F (36.7  C) (Oral)   Resp 20   Wt 96.1 kg (211 lb 12.8 oz)   SpO2 94%   BMI 30.39 kg/m      General: alert, oriented. RODRIGUEZ. Speech clear.     Motor: normal bulk and tone    Strength:   Full strength lower extremities     Sensation: intact to light touch     Incision: not viewed. Surgical dressing in place     Drains X2: 30 and 50 ml out overnight    Imaging: no post op imaging required     Gloria Blood, GIO-CNP  [facility] " Neurosurgery  O: [phone]

## 2022-12-10 PROCEDURE — 250N000013 HC RX MED GY IP 250 OP 250 PS 637: Performed by: CLINICAL NURSE SPECIALIST

## 2022-12-10 PROCEDURE — 250N000013 HC RX MED GY IP 250 OP 250 PS 637: Performed by: NURSE PRACTITIONER

## 2022-12-10 RX ORDER — ACETAMINOPHEN 325 MG/1
975 TABLET ORAL EVERY 8 HOURS
COMMUNITY
Start: 2022-12-10 | End: 2023-01-10

## 2022-12-10 RX ORDER — ASPIRIN 81 MG/1
81 TABLET, CHEWABLE ORAL EVERY MORNING
COMMUNITY
Start: 2022-12-10

## 2022-12-10 RX ADMIN — ACETAMINOPHEN 975 MG: 325 TABLET ORAL at 08:07

## 2022-12-10 RX ADMIN — Medication 500 MG: at 08:12

## 2022-12-10 RX ADMIN — FAMOTIDINE 20 MG: 20 TABLET ORAL at 08:07

## 2022-12-10 RX ADMIN — Medication 1000 UNITS: at 08:07

## 2022-12-10 RX ADMIN — AMLODIPINE BESYLATE 5 MG: 5 TABLET ORAL at 08:07

## 2022-12-10 RX ADMIN — THERA TABS 1 TABLET: TAB at 08:07

## 2022-12-10 ASSESSMENT — ACTIVITIES OF DAILY LIVING (ADL)
ADLS_ACUITY_SCORE: 21

## 2022-12-10 NOTE — PROGRESS NOTES
Patient OK to discharge. Spoke with him on the telephone since I am at  and he is requesting discharge. He denies pain. Feels great. RUBINA drains should be out before discharge. Denies need for any discharge pain medications. We will set up follow up.     HYACINTH Brennan  [facility] Neurosurgery  O: [phone]

## 2022-12-10 NOTE — PLAN OF CARE
Goal Outcome Evaluation:       Pt discharged in stable condition, discharge education completed with pt and wife. Dressing changed prior to discharge, taught wife how to change dressing and what to look for. Incision clean, dry and approximated, staples in place. Pt denies pain.

## 2022-12-10 NOTE — DISCHARGE INSTRUCTIONS
Resume supplements such as Fish Oil, Omega 3 and anything else that may increase your risk of bleeding 5 days after surgery     Smoking will increase your risk of poor wound healing

## 2022-12-10 NOTE — PLAN OF CARE
Problem: Pain Acute  Goal: Optimal Pain Control and Function  Outcome: Progressing  Intervention: Prevent or Manage Pain  Recent Flowsheet Documentation  Taken 12/10/2022 0000 by Shonna Parsons RN  Medication Review/Management: medications reviewed     Problem: Mobility Impairment  Goal: Optimal Mobility  Outcome: Progressing  Intervention: Optimize Mobility  Recent Flowsheet Documentation  Taken 12/10/2022 0000 by Shonna Parsons RN  Activity Management: activity adjusted per tolerance   Goal Outcome Evaluation:       Pt is alert and oriented x 4, verbalized just a little discomfort at the surgical site otherwise he has no pain. On scheduled Tylenol 975 mg po q 8 hours. Uses urinal at bedside. He denies numbness or tingling in the legs. RUBINA drains removed this morning by GIOVANY RN. Pt is asking what time he can be discharged, told him he has to wait until the rounding doctor comes in the morning.

## 2022-12-10 NOTE — PLAN OF CARE
Problem: Pain Acute  Goal: Optimal Pain Control and Function  Outcome: Progressing  Intervention: Prevent or Manage Pain  Recent Flowsheet Documentation  Taken 12/9/2022 1600 by Pedro Tolbert RN  Medication Review/Management: medications reviewed     Problem: Mobility Impairment  Goal: Optimal Mobility  Outcome: Progressing   Goal Outcome Evaluation:       Pt alert and oriented x4. Made his needs known. Vital signs wdl. Denied pain. Was able to ambulate to the bathroom. Voiding without difficulty. Pt refused his RUBINA drain out this shift. Rubina out put  5 ml  from each.

## 2022-12-11 ENCOUNTER — TELEPHONE (OUTPATIENT)
Dept: NEUROLOGY | Facility: CLINIC | Age: 62
End: 2022-12-11

## 2022-12-11 NOTE — TELEPHONE ENCOUNTER
Follow up with nursing two weeks on Dr Delgado castellon   WANDY six weeks on Dr Delgado castellon     S/P L2-3 and L4-5 lumbar hemilaminectomies, medical facetectomies, and foraminotomies and L4-5 bilateral microdiscectomy 12/8/2022 with Dr Natarajan

## 2022-12-12 ENCOUNTER — TELEPHONE (OUTPATIENT)
Dept: PHYSICAL MEDICINE AND REHAB | Facility: CLINIC | Age: 62
End: 2022-12-12

## 2022-12-12 DIAGNOSIS — M54.16 LUMBAR RADICULOPATHY: Primary | ICD-10-CM

## 2022-12-12 RX ORDER — IBUPROFEN 800 MG/1
800 TABLET, FILM COATED ORAL EVERY 8 HOURS PRN
Qty: 40 TABLET | Refills: 0 | Status: SHIPPED | OUTPATIENT
Start: 2022-12-12 | End: 2023-01-10

## 2022-12-12 RX ORDER — METHOCARBAMOL 750 MG/1
750 TABLET, FILM COATED ORAL 4 TIMES DAILY PRN
Qty: 40 TABLET | Refills: 0 | Status: SHIPPED | OUTPATIENT
Start: 2022-12-12 | End: 2023-01-10

## 2022-12-12 NOTE — TELEPHONE ENCOUNTER
Called and spoke with flex Villegas'fiona GUAJARDO ([facility] on file) who enquired about pain medication for Roberto's intermittent mild to moderate low back pain. No radicular symptoms. No other concerns.  Taina Robertson RN, CNRN

## 2022-12-12 NOTE — TELEPHONE ENCOUNTER
Patients wife states he had surgery last Friday and wasn't given pain medication, and he is needing some.

## 2022-12-21 ENCOUNTER — MEDICAL CORRESPONDENCE (OUTPATIENT)
Dept: NEUROSURGERY | Facility: CLINIC | Age: 62
End: 2022-12-21

## 2022-12-24 NOTE — DISCHARGE SUMMARY
HOSPITAL DISCHARGE SUMMARY         Patient Name: Richard Hickman  YOB: 1960  Age: 62 year old  Medical Record Number: [id number]  Primary Physician: No Ref-Primary, Physician  Admission Date: 12/8/2022.  Discharge Date: 12/10/2022      Richard Hickman will be discharged from [facility] to home    PRINCIPAL DIAGNOSIS CAUSING ADMISSION: Cauda equina compression (H)    Discharge Diagnoses:  Principal Problem:    Cauda equina compression (H)      HPI: Presented 12/1/2022 with signs of cauda equina syndrome with leg weakness, numbness, saddle anesthesia and urinary retention. Able to have erection but no penile sensation. Unable to walk. Crawling around his home. Symptoms started 11/1/2022. MRI severe canal stenosis L2-3, L4-5. L2-3 ligamentous and facet hypertrophy in setting of a broad based disc contributing to severe stenosis. L4-5 large disc herniation severely impinging nerve roots. Recovered from opioid addiction. Was on suboxone up until 3 months ago.     BRIEF HOSPITAL COURSE: Richard Hickman is a 62 year old male who was admitted on day of surgery and underwent Procedure(s):  lumbar 2 to lumbar 3 and lumbar 4 to lumbar 5 hemilaminectomies, medial facetectomies, and foraminotomies and lumbar 4 to lumbar 5  bilateral microdiscectomies.  Surgery was without complications.  Postoperatively he reported feeling great.     On exam on day of discharge, his strength was full with no new focal deficits.  PT/OT consultations were obtained to assess function, assist with mobilization, and evaluate for discharge recommendations.  By POD # 2 he was tolerating a regular diet, ambulating, voiding without difficulty, and obtaining good pain control on oral medication. Sensation in groin and legs improved. His incision was clean, dry and intact.   He met all discharge criteria and was stable for discharge.      PROCEDURES PERFORMED DURING HOSPITALIZATION: Procedure/Surgery Information   Procedure:  Procedure(s):  lumbar 2 to lumbar 3 and lumbar 4 to lumbar 5 hemilaminectomies, medial facetectomies, and foraminotomies and lumbar 4 to lumbar 5  bilateral microdiscectomies   Surgeon(s): Surgeon(s) and Role:     * Mallory Natarajan MD - Primary     * Bere Francisco NP - Assisting             COMPLICATIONS IN HOSPITAL:  None.    IMPORTANT PENDING TEST RESULTS: None.    PERTINENT FINDINGS/RESULTS AT DISCHARGE:  None.    CONDITION AT DISCHARGE:  improving    DISCHARGE MEDICATIONS  No current facility-administered medications for this encounter.    Current Outpatient Medications:      acetaminophen (TYLENOL) 325 MG tablet, Take 3 tablets (975 mg) by mouth every 8 hours, Disp: , Rfl:      amLODIPine (NORVASC) 5 MG tablet, [AMLODIPINE (NORVASC) 5 MG TABLET] Take 1 tablet (5 mg total) by mouth daily., Disp: 30 tablet, Rfl: 0     aspirin (ASA) 81 MG chewable tablet, Take 1 tablet (81 mg) by mouth every morning CAN RESUME 12/11/2022, Disp: , Rfl:      atorvastatin (LIPITOR) 20 MG tablet, [ATORVASTATIN (LIPITOR) 20 MG TABLET] Take 1 tablet (20 mg total) by mouth at bedtime., Disp: 30 tablet, Rfl: 0     cholecalciferol, vitamin D3, 1,000 unit (25 mcg) tablet, [CHOLECALCIFEROL, VITAMIN D3, 1,000 UNIT (25 MCG) TABLET] Take 1,000 Units by mouth every morning., Disp: , Rfl:      OMEGA-3/DHA/EPA/FISH OIL (FISH OIL-OMEGA-3 FATTY ACIDS) 300-1,000 mg capsule, [OMEGA-3/DHA/EPA/FISH OIL (FISH OIL-OMEGA-3 FATTY ACIDS) 300-1,000 MG CAPSULE] Take 1 capsule (1 g total) by mouth daily., Disp: 30 capsule, Rfl: 0     ibuprofen (ADVIL/MOTRIN) 800 MG tablet, Take 1 tablet (800 mg) by mouth every 8 hours as needed for moderate pain (4-6), Disp: 40 tablet, Rfl: 0     methocarbamol (ROBAXIN) 750 MG tablet, Take 1 tablet (750 mg) by mouth 4 times daily as needed for muscle spasms, Disp: 40 tablet, Rfl: 0       AFTER DISCHARGE ORDERS AND INSTRUCTIONS:     Follow up with Neurosurgery: in 2 weeks, 6 weeks   Follow up appointment with Primary  "Care Physician: No Ref-Primary, Physician as needed  Diet: Orders Placed This Encounter      Diet     Activity: *No lifting, pushing or pulling greater than 5-10 pound (this is about a gallon of milk).  *No repetitive bending, twisting, or jarring activities  *No overhead work  *No aerobic or strenuous activity  *No activities with increased risk of falls  *You may move about your home as tolerated  *You may walk up and down stairs as tolerated  *You may increase your activity slowly over the next 4-6 weeks    Warnings: Call [phone] with the following symptoms:    *Temperature 101(fever) or greater or chills  *Redness, swelling or increased drainage from the wound  *Worsening pain not relieved by the pain prescription given  *Worsening or new onset of weakness, or numbness and tingling  *Loss or change in your ability to control bowel or bladder function  *Change in your ability to walk, talk, see or think  *If you did not have a bowel movement before leaving the hospital and your bowels have not moved within 48 hours of your discharge    Wound care: WOUND CARE WITH STAPLES:  * Staples are usually removed in 1-3 weeks.  They are sometimes left longer.  * Keep a dressing on the wound until the staples are removed. You may use an extra large band-aid or 4x4 and tape.  Both can be purchased at your pharmacy.  * No lotions, soaps, powders, ointments, creams, or patches on incision until the wound   is well healed.  The incision does not need to be \"cleaned\" with soap and water.    * * Change the dressing at least daily, more frequently if necessary to keep the wound clean and dry.      LINNEA Alamo CNP  [facility]   [address] [place] [address]  Office: [phone]    "

## 2022-12-27 ENCOUNTER — ALLIED HEALTH/NURSE VISIT (OUTPATIENT)
Dept: NEUROSURGERY | Facility: CLINIC | Age: 62
End: 2022-12-27
Payer: OTHER MISCELLANEOUS

## 2022-12-27 VITALS — RESPIRATION RATE: 18 BRPM | DIASTOLIC BLOOD PRESSURE: 86 MMHG | HEART RATE: 76 BPM | SYSTOLIC BLOOD PRESSURE: 136 MMHG

## 2022-12-27 DIAGNOSIS — G83.4 CAUDA EQUINA SYNDROME WITH NEUROGENIC BLADDER (H): Primary | ICD-10-CM

## 2022-12-27 PROCEDURE — 99207 PR NO CHARGE NURSE ONLY: CPT

## 2022-12-27 RX ORDER — METHYLPREDNISOLONE 4 MG
TABLET, DOSE PACK ORAL
Qty: 21 TABLET | Refills: 0 | Status: SHIPPED | OUTPATIENT
Start: 2022-12-27 | End: 2023-01-10

## 2022-12-27 NOTE — PROGRESS NOTES
"Richard Hickman is status post  Lumbar 2- lumbar 3 and lumbar 4 - lumbar 5 hemilaminectomies, medial facetectomies, and foraminotomies; Lumbar 4 to lumbar 5 bilateral microdiscectomies on 12/08/2022 with Dr. Natarajan.  Preoperatively presented with symptoms of cauda equina syndrome including leg weakness, numbness, saddle anesthesia and urinary retention.  Today he returns in follow up for staples removal. He is accompanied by his SO and [facility]. He is doing reasonably well - reports resolved saddle anesthesia and urinary retention. Endorses pain in both knees with walking. Notes residual numbness in feet. Feels legs are getting \"stronger\", uses a walker for safety. Takes oxycodone 5 mg prn for intermittent mild to moderate bilateral low back pain. On Robaxin and Advil prn. Gait and balance are stable.      Surgical wound WNL - CDI, no signs of infection or skin breakdown.  Incision well-healed: good skin approximation, no redness or visible/palpable edema, no tenderness to palpation.  PT. AF, denies fever, chills or sweats.  Pt. reports that the symptoms are improved from pre-op.    Staples - intact removed without difficulty. Wound prepped with Betadine before and after removal.  Surrounding skin has no signs of breakdown.  Verbal instructions regarding incision care are given.  Pt. advised to call us if any s/s of infection noted - all discussed in details.      Taina Robertson RN, CNRN      "

## 2023-01-10 ENCOUNTER — TELEPHONE (OUTPATIENT)
Dept: NEUROSURGERY | Facility: CLINIC | Age: 63
End: 2023-01-10

## 2023-01-10 DIAGNOSIS — G83.4 CAUDA EQUINA COMPRESSION (H): ICD-10-CM

## 2023-01-10 DIAGNOSIS — M54.16 LUMBAR RADICULOPATHY: ICD-10-CM

## 2023-01-10 RX ORDER — ACETAMINOPHEN 325 MG/1
975 TABLET ORAL EVERY 8 HOURS PRN
Qty: 40 TABLET | Refills: 0 | Status: SHIPPED | OUTPATIENT
Start: 2023-01-10

## 2023-01-10 RX ORDER — METHOCARBAMOL 750 MG/1
750 TABLET, FILM COATED ORAL 4 TIMES DAILY PRN
Qty: 40 TABLET | Refills: 1 | Status: SHIPPED | OUTPATIENT
Start: 2023-01-10

## 2023-01-10 RX ORDER — IBUPROFEN 800 MG/1
800 TABLET, FILM COATED ORAL EVERY 8 HOURS PRN
Qty: 40 TABLET | Refills: 0 | Status: SHIPPED | OUTPATIENT
Start: 2023-01-10

## 2023-01-10 NOTE — TELEPHONE ENCOUNTER
"Returned call to pt who is s/p Lumbar 2- lumbar 3 and lumbar 4 - lumbar 5 hemilaminectomies, medial facetectomies, and foraminotomies; Lumbar 4 to lumbar 5 bilateral microdiscectomies on 12/08/2022 with Dr. Natarajan.    He clarified that he would like a refill of methocarbamol, tylenol, and ibuprofen. He completed the medrol dose pack with moderate relief. However, he is still experiencing a \"knot in his back\". Recommended consistent use of methocarbamol, tylenol, and ibuprofen along with alternating ice and heat to loosen any muscle spasms.     Pt verbalized understanding and agreement with the above plan. All questions answered to his satisfaction.     Destiny Thompson RN     "

## 2023-01-10 NOTE — TELEPHONE ENCOUNTER
Richard called stating that he is needing a refill of medication methylPREDNISolone for his pain and that patient is out. Preferred pharmacy [id number] - [facility], [place] - [address] [facility] AT [address].    Thank you,  Soren Bell

## 2023-01-17 ENCOUNTER — OFFICE VISIT (OUTPATIENT)
Dept: NEUROSURGERY | Facility: CLINIC | Age: 63
End: 2023-01-17

## 2023-01-17 VITALS
DIASTOLIC BLOOD PRESSURE: 91 MMHG | SYSTOLIC BLOOD PRESSURE: 119 MMHG | TEMPERATURE: 97.9 F | OXYGEN SATURATION: 98 % | HEART RATE: 86 BPM

## 2023-01-17 DIAGNOSIS — G83.4 CAUDA EQUINA COMPRESSION (H): Primary | ICD-10-CM

## 2023-01-17 DIAGNOSIS — M51.26 LUMBAR DISC HERNIATION: ICD-10-CM

## 2023-01-17 PROCEDURE — 99024 POSTOP FOLLOW-UP VISIT: CPT | Performed by: PHYSICIAN ASSISTANT

## 2023-01-17 RX ORDER — GABAPENTIN 300 MG/1
300 CAPSULE ORAL 3 TIMES DAILY
Qty: 90 CAPSULE | Refills: 1 | Status: SHIPPED | OUTPATIENT
Start: 2023-01-17

## 2023-01-17 RX ORDER — METHYLPREDNISOLONE 4 MG
TABLET, DOSE PACK ORAL
Qty: 21 TABLET | Refills: 0 | Status: SHIPPED | OUTPATIENT
Start: 2023-01-17

## 2023-01-17 NOTE — PROGRESS NOTES
Neurosurgery Progress Note: 1/17/2023     A/P: postoperative Lumbar 2- lumbar 3 and lumbar 4 - lumbar 5 hemilaminectomies, medial facetectomies, and foraminotomies with lumbar 4- lumbar 5 bilateral microdiscectomies on 12/8/2022      Plan:  Mr. Hickman has been advised to begin physical therapy focusing on core strength and lower extremity strength. He has been advised to remain out of work for an additional 6 weeks until follow up for further evaluation. Will continue to limit his lifting restrictions to no more than 15 pounds. He has also been advised to begin Gabapentin and has been told how to taper dosing to 300 mg three times a day. He has also been advised to continue with muscle relaxants as needed. A medrol dospak has been sent to his pharmacy. Will plan for follow up in 6 weeks and he understands that should any symptoms worsen to contact the office sooner.     Mr. Hickman is a pleasant 62 year old right handed male who presents postoperative Lumbar 2- lumbar 3 and lumbar 4 - lumbar 5 hemilaminectomies, medial facetectomies, and foraminotomies with lumbar 4- lumbar 5 bilateral microdiscectomies on 12/8/2022 by Dr. Natarajan. Presently he states though he is doing better he continues to have severe low back pain and fells a bump towards that upper portion of his incision that has gone down in size since taking steroids. He states that since surgery his radicular pain from his buttocks to knees bilaterally has completely resolved but notes continued radicular pain from his knees to his feet that though he states is better is still present. He was prescribed medrol dospak in the back and his lower leg pain improved further well on the medication. He continued to feel weak but was unable to walk prior to surgery and is now ambulatory with a walker. 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     HPI: from preop   Richard Hickman is a 63 yo male who presents with symptoms of cauda equina syndrome  including leg weakness, numbness, saddle anesthesia and urinary retention. Lumbar xray shows reversal of normal lordosis centered at L3 without instability. MRI of his lumbar spine shows severe canal stenosis at L2-3 and L4-5. L2-3 he has ligamentous and facet hypertrophy in setting of a broad based disc contributing to his severe stenosis and at lumbar 4-5 he has a large disc herniation very severely impinging his nerve roots. Recommend urgent decompression including L2-3 and L4-5 midline sparing decompression with hemilaminectomies, medial facetectomies and foraminotomies and at lumbar 4-5 bilateral microdiscectomies. Risks and benefits of surgery discussed including but not limited to infection, hematoma, nerve damage including paralysis, post op radiculitis, durotomy, residual symptoms, risks associated with the use of general anesthesia, blood clots in the lungs or legs. We discussed symptoms have been ongoing some time so recovery may take a while and he may have residual deficits. The patient and his wife appeared to understand and agreed to proceed.     Exam:  BP (!) 119/91   Pulse 86   Temp 97.9  F (36.6  C)   SpO2 98%     General: alert and oriented x3     Strength is 5/5 throughout both upper extremities throughout slight give way weakness of lower extremity but much improved from preop 4+/5 throughout lower extremities     Sensation is intact throughout both upper and lower extremities    Gait is smooth and coordinated    Incision: well healed without erythema, induration, or drainage, noted seroma swelling around superior portion incision     Tenderness to paraspinal muscles of posterior cervical spine into shoulders         Dana Danielle PA-C  [facility]  O: [phone]

## 2023-01-17 NOTE — PATIENT INSTRUCTIONS
Mr. Hickman has been advised to begin physical therapy focusing on core strength and lower extremity strength. He has been advised to remain out of work for an additional 6 weeks until follow up for further evaluation. Will continue to limit his lifting restrictions to no more than 15 pounds. He has also been advised to begin Gabapentin and has been told how to taper dosing to 300 mg three times a day. He has also been advised to continue with muscle relaxants as needed. A medrol dospak has been sent to his pharmacy. Will plan for follow up in 6 weeks and he understands that should any symptoms worsen to contact the office sooner.

## 2023-01-17 NOTE — LETTER
1/17/2023         RE: Richard Hickman  [address]  [place] [address]        Dear Colleague,    Thank you for referring your patient, Richard Hickman, to the [facility]. Please see a copy of my visit note below.    Neurosurgery Progress Note: 1/17/2023     A/P: postoperative Lumbar 2- lumbar 3 and lumbar 4 - lumbar 5 hemilaminectomies, medial facetectomies, and foraminotomies with lumbar 4- lumbar 5 bilateral microdiscectomies on 12/8/2022      Plan:  Mr. Hickman has been advised to begin physical therapy focusing on core strength and lower extremity strength. He has been advised to remain out of work for an additional 6 weeks until follow up for further evaluation. Will continue to limit his lifting restrictions to no more than 15 pounds. He has also been advised to begin Gabapentin and has been told how to taper dosing to 300 mg three times a day. He has also been advised to continue with muscle relaxants as needed. A medrol dospak has been sent to his pharmacy. Will plan for follow up in 6 weeks and he understands that should any symptoms worsen to contact the office sooner.     Mr. Hickman is a pleasant 62 year old right handed male who presents postoperative Lumbar 2- lumbar 3 and lumbar 4 - lumbar 5 hemilaminectomies, medial facetectomies, and foraminotomies with lumbar 4- lumbar 5 bilateral microdiscectomies on 12/8/2022 by Dr. Natarajan. Presently he states though he is doing better he continues to have severe low back pain and fells a bump towards that upper portion of his incision that has gone down in size since taking steroids. He states that since surgery his radicular pain from his buttocks to knees bilaterally has completely resolved but notes continued radicular pain from his knees to his feet that though he states is better is still present. He was prescribed medrol dospak in the back and his lower leg pain improved further well on the medication. He continued to  feel weak but was unable to walk prior to surgery and is now ambulatory with a walker. 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     HPI: from preop   Richard Hickman is a 63 yo male who presents with symptoms of cauda equina syndrome including leg weakness, numbness, saddle anesthesia and urinary retention. Lumbar xray shows reversal of normal lordosis centered at L3 without instability. MRI of his lumbar spine shows severe canal stenosis at L2-3 and L4-5. L2-3 he has ligamentous and facet hypertrophy in setting of a broad based disc contributing to his severe stenosis and at lumbar 4-5 he has a large disc herniation very severely impinging his nerve roots. Recommend urgent decompression including L2-3 and L4-5 midline sparing decompression with hemilaminectomies, medial facetectomies and foraminotomies and at lumbar 4-5 bilateral microdiscectomies. Risks and benefits of surgery discussed including but not limited to infection, hematoma, nerve damage including paralysis, post op radiculitis, durotomy, residual symptoms, risks associated with the use of general anesthesia, blood clots in the lungs or legs. We discussed symptoms have been ongoing some time so recovery may take a while and he may have residual deficits. The patient and his wife appeared to understand and agreed to proceed.     Exam:  BP (!) 119/91   Pulse 86   Temp 97.9  F (36.6  C)   SpO2 98%     General: alert and oriented x3     Strength is 5/5 throughout both upper extremities throughout slight give way weakness of lower extremity but much improved from preop 4+/5 throughout lower extremities     Sensation is intact throughout both upper and lower extremities    Gait is smooth and coordinated    Incision: well healed without erythema, induration, or drainage, noted seroma swelling around superior portion incision     Tenderness to paraspinal muscles of posterior cervical spine into shoulders         BRANDEE Bullock  [facility]  O: [phone]      Again, thank you for allowing me to participate in the care of your patient.        Sincerely,        Dana Danielle PA-C

## 2023-02-08 ENCOUNTER — THERAPY VISIT (OUTPATIENT)
Dept: PHYSICAL THERAPY | Facility: CLINIC | Age: 63
End: 2023-02-08
Payer: OTHER MISCELLANEOUS

## 2023-02-08 DIAGNOSIS — M54.42 ACUTE BILATERAL LOW BACK PAIN WITH BILATERAL SCIATICA: ICD-10-CM

## 2023-02-08 DIAGNOSIS — Z47.89 ENCOUNTER FOR OTHER ORTHOPEDIC AFTERCARE: ICD-10-CM

## 2023-02-08 DIAGNOSIS — M54.41 ACUTE BILATERAL LOW BACK PAIN WITH BILATERAL SCIATICA: ICD-10-CM

## 2023-02-08 DIAGNOSIS — M51.26 LUMBAR DISC HERNIATION: ICD-10-CM

## 2023-02-08 DIAGNOSIS — G83.4 CAUDA EQUINA COMPRESSION (H): ICD-10-CM

## 2023-02-08 PROCEDURE — 97161 PT EVAL LOW COMPLEX 20 MIN: CPT | Mod: GP | Performed by: PHYSICAL THERAPIST

## 2023-02-08 PROCEDURE — 97110 THERAPEUTIC EXERCISES: CPT | Mod: GP | Performed by: PHYSICAL THERAPIST

## 2023-02-08 PROCEDURE — 97530 THERAPEUTIC ACTIVITIES: CPT | Mod: GP | Performed by: PHYSICAL THERAPIST

## 2023-02-08 NOTE — PROGRESS NOTES
Physical Therapy Initial Evaluation  Subjective:  The history is provided by the patient.   Patient Health History  Richard Hickman being seen for  Surgical aftercare for cauda equina compression.     Problem began: 12/8/2022 (date of surgery).   Problem occurred: Injury at work   Pain is reported as 8/10 on pain scale.  General health as reported by patient is good.  Pertinent medical history includes: high blood pressure.   Red flags:  None as reported by patient.     Surgeries include:  Orthopedic surgery. Other surgery history details: postoperative Lumbar 2- lumbar 3 and lumbar 4 - lumbar 5 hemilaminectomies, medial facetectomies, and foraminotomies with lumbar 4- lumbar 5 bilateral microdiscectomies .    Current medications:  High blood pressure medication, muscle relaxants, pain medication and steroids.    Current occupation is Journeyman/.   Primary job tasks include:  Lifting/carrying, prolonged standing, repetitive tasks, prolonged sitting, operating a machine/assembly, pushing/pulling and driving.                  Therapist Generated HPI Evaluation  Problem details: The patient is a pleasant 62 year old male who presents to the clinic with complaints of lower extremity weakness and numbness following surgery on 12/8/22. The patient notes he was injured at work, waited 40 days to have surgery, and is feeling much better following the surgery, particularly noting less pain along the sciatic nerve distribution. He continues to note sensations of numbness and tingling bilaterally below the knees with a sensation of pins and needles on bilateral great toes. He reports that activities he has been experiencing the greatest difficulty with include laying on his back, walking with a walker, and performing transfers noting he needs his wife to assist him in getting out of bed and up from a chair. Notes improvements in his bowel and bladder control following surgery. The patient notes feeling of depression as  "well as suicidal thoughts following the injury and prior to the surgery. Notes that he is not currently feeling down or depressed, has not lost interest in activities which interest him, and he has no plans to harm himself or end his own life, noting a strong support group with his significant other. The patient notes feeling confident in his ability to recover his strength, but notes anxiety related to the recovery process for his nerves which he notes \"feel damaged\" The patient's presentation is consistent with the referring provider's medical diagnosis..         Type of problem:  Lumbar.    This is a new condition.    Where condition occurred: at work.  Patient reports pain:  Lower lumbar spine.  Pain is described as shooting and is constant.  Pain radiates to:  Gluteals left, gluteals right, thigh right, lower leg right, foot left and foot right. Pain is the same all the time.  Since onset symptoms are gradually improving.  Associated symptoms:  Loss of balance, numbness, loss of motion, loss of motion/stiffness and tingling (altered sensation/numbness below the knees bilaterally, pins and needles pain in great toes bilaterally). Symptoms are exacerbated by sitting  and relieved by rest (standing with upper extremity support in a trunk forward flexed position).  Special tests included:  X-ray.    Restrictions due to condition include:  Currently not working due to present treatment.  Barriers include:  Requires assistance with ADL's.                        Objective:  Standing Alignment:    Cervical/Thoracic:  Forward head    Lumbar:  Lordosis decr and posterior pelvic tilt            Gait:  Pain noted bilaterally during stance phase. Heavy reliance on upper extremity support through walker. During rest breaks leans forward on walker noting this relieves symptoms of low back and leg pain.  Gait Type:  Antalgic   Assistive Devices:  Walker                 Lumbar/SI Evaluation    Lumbar Myotomes:    T12-L3 (Hip " Flex):  Left: 4-      L2-4 (Quads):  Left:  4-      L4 (Ankle DF):  Left:  4-      L5 (Great Toe Ext): Left: 3+      S1 (Toe Raise):  Left: 3          Lumbar Dermtomes:        L2 Left:  Normal-light touch     L2 Right:  Normal-light touch  L3 Left:  Normal-light touch     L3 Right:  Normal-light touch  L4 Left:  Hypo-light touch       L4 Right:  Hypo-light touch  L5 Left:  Hypo-light touch     L5 Right:  Hypo-light touch  S1 Left:  Hypo-light touch     S1 Right:  Hypo-light touch                                              Hip Evaluation  HIP AROM:  : ROM assessed in sitting, patient presents with overall decreased AROM through the lumbar spine, hips, knees, ankles.                    Hip Strength:    Flexion:   Left: 4-/5   Pain:  Right: 4-/5   Pain:                    Extension:  Left: 3+/5  Pain:Right: 3+/5    Pain:    Abduction:  Left: 4-/5     Pain:Right: 4-/5    Pain:  Adduction:  Left: 3+/5    Pain:Right: 3+/5   Pain:  Internal Rotation:  Left: 3+/5    Pain:Right: 3+/5   Pain:  External Rotation:  Left: 3+/5   Pain:  Right: 3+/5   Pain:  Knee Flexion:  Left: 3+/5   Pain:Right: 3+/5   Pain:  Knee Extension:  Left: 4-/5   Pain:Right: 4-/5    Pain:          Hip Palpation:  Palpations normal left hip: palpation of the surgical site reveals an elevation of tissue, patient notes that touching this area results in a comfortable pain-relieving sensation down his legs.                     General Evaluation:      Gross Strength:                Core:   Significant findings:  Weak core strength, patient notes difficulty with all transitional movements requiring upper extremity assistance, and has difficulty maintaining upright posture without upper extremity support                                                             ROS    Assessment/Plan:    Patient is a 62 year old male with lumbar complaints.    Patient has the following significant findings with corresponding treatment plan.                Diagnosis 1:   Low back pain with resultant radiculopathy  Pain -  hot/cold therapy, electric stimulation, manual therapy, self management, education and home program  Decreased ROM/flexibility - manual therapy and therapeutic exercise  Decreased strength - therapeutic exercise and therapeutic activities  Impaired balance - neuro re-education and therapeutic activities  Impaired gait - gait training  Impaired muscle performance - neuro re-education  Decreased function - therapeutic activities  Impaired posture - neuro re-education  Instability -  Therapeutic Activity  Therapeutic Exercise    Therapy Evaluation Codes:   1) History comprised of:   Personal factors that impact the plan of care:      Age, Past/current experiences, Time since onset of symptoms and Work status.    Comorbidity factors that impact the plan of care are:      Depression, Numbness/tingling and Pain at night/rest.     Medications impacting care: Pain.  2) Examination of Body Systems comprised of:   Body structures and functions that impact the plan of care:      Lumbar spine.   Activity limitations that impact the plan of care are:      Bending, Dressing, Lifting, Sitting, Standing, Walking, Sleeping and Laying down.  3) Clinical presentation characteristics are:   Stable/Uncomplicated.  4) Decision-Making    Low complexity using standardized patient assessment instrument and/or measureable assessment of functional outcome.  Cumulative Therapy Evaluation is: Low complexity.    Previous and current functional limitations:  (See Goal Flow Sheet for this information)    Short term and Long term goals: (See Goal Flow Sheet for this information)     Communication ability:  Patient appears to be able to clearly communicate and understand verbal and written communication and follow directions correctly.  Treatment Explanation - The following has been discussed with the patient:   RX ordered/plan of care  Anticipated outcomes  Possible risks and side effects  This  patient would benefit from PT intervention to resume normal activities.   Rehab potential is good.    Frequency:  1 X week, once daily  Duration:  for 12 weeks  Discharge Plan:  Achieve all LTG.  Independent in home treatment program.  Reach maximal therapeutic benefit.    Please refer to the daily flowsheet for treatment today, total treatment time and time spent performing 1:1 timed codes.

## 2023-02-09 PROBLEM — Z47.89 ENCOUNTER FOR OTHER ORTHOPEDIC AFTERCARE: Status: ACTIVE | Noted: 2023-02-09

## 2023-02-09 PROBLEM — M54.41 ACUTE BILATERAL LOW BACK PAIN WITH BILATERAL SCIATICA: Status: ACTIVE | Noted: 2023-02-09

## 2023-02-09 PROBLEM — M54.42 ACUTE BILATERAL LOW BACK PAIN WITH BILATERAL SCIATICA: Status: ACTIVE | Noted: 2023-02-09

## 2023-02-09 NOTE — PROGRESS NOTES
[facility]    OUTPATIENT Physical Therapy ORTHOPEDIC EVALUATION  PLAN OF TREATMENT FOR OUTPATIENT REHABILITATION  (COMPLETE FOR INITIAL CLAIMS ONLY)  Patient's Last Name, First Name, M.I.  YOB: 1960  Richard Hickman    Provider s Name:  [facility]   Medical Record No.  [id number]   Start of Care Date:      Onset Date:   12/08/22   Treatment Diagnosis:  Low back pain, Surgical aftercare for cauda equina compression. Medical Diagnosis:     Cauda equina compression (H)  Lumbar disc herniation  Acute bilateral low back pain with bilateral sciatica  Encounter for other orthopedic aftercare       Goals:     02/08/23 0500   Body Part   Goals listed below are for Low back pain   Goal #1   Goal #1 self cares/transfers/bed mobility   Previous Functional Level No restrictions;Could do all self cares independently   Current Functional Level Requires moderate assistance;to do the following self cares;to transfer in and out of a car;to transfer in and out of a bed;to change positions in bed   Performance Level 8/10 pain   STG Target Performance Be able to ; transfer in and out of a car; transfer in and out of a chair; transfer in and out of a bed; with; minimal assistance   Performance Level Min Ax1 4/10 pain   Rationale for independent self care such as dressing, personal hygiene, bathing;for independent community transportation;for independent living   Due Date 02/23/23   LTG Target Performance Be able to ; transfer in and out of a car; transfer in and out of a chair; transfer in and out of a bed; independently   Performance level independently, <2/10 pain   Rationale for independent community transportation;independence in self cares;for independent living   Due Date 05/04/23   Goal #2   Goal #2 ambulation   Previous Functional Level No restrictions   Current Functional Level  Feet patient can walk;with walker   Performance Level 300 ft, 8/10 pain   STG Target Performance Feet patient will be able to walk;with walker   Performance Level 1000 ft, 4/10 pain   Rationale for safe outdoor household ambulation;for safe community ambulation;to maintain proper body mechanics/posture while ambulating to avoid additional compensatory injury due to improper gait mechanics;to promote a healthy and active lifestyle   Due Date 02/23/23    LTG Target Performance Minutes patient will be able to  walk;without assistive device   Performance Level 30 minutes, <2/10 pain   Rationale for safe outdoor household ambulation;for safe community ambulation;for safe work place ambulation;to maintain proper body mechanics/posture while ambulating to avoid additional compensatory injury due to improper gait mechanics;to promote a healthy and active lifestyle   Due Date 05/04/23       Therapy Frequency:   1x daily/week  Predicted Duration of Therapy Intervention:   12 weeks    JADE CASON                 I CERTIFY THE NEED FOR THESE SERVICES FURNISHED UNDER        THIS PLAN OF TREATMENT AND WHILE UNDER MY CARE     (Physician attestation of this document indicates review and certification of the therapy plan).                     Certification Date From:    02/08/23  Certification Date To:   05/04/23  Referring Provider:  Dana Danielle    Initial Assessment        See Epic Evaluation

## 2023-02-21 ENCOUNTER — THERAPY VISIT (OUTPATIENT)
Dept: PHYSICAL THERAPY | Facility: CLINIC | Age: 63
End: 2023-02-21
Payer: OTHER MISCELLANEOUS

## 2023-02-21 DIAGNOSIS — M54.42 ACUTE BILATERAL LOW BACK PAIN WITH BILATERAL SCIATICA: Primary | ICD-10-CM

## 2023-02-21 DIAGNOSIS — Z47.89 ENCOUNTER FOR OTHER ORTHOPEDIC AFTERCARE: ICD-10-CM

## 2023-02-21 DIAGNOSIS — M54.41 ACUTE BILATERAL LOW BACK PAIN WITH BILATERAL SCIATICA: Primary | ICD-10-CM

## 2023-02-21 PROCEDURE — 97530 THERAPEUTIC ACTIVITIES: CPT | Mod: GP | Performed by: PHYSICAL THERAPIST

## 2023-02-21 PROCEDURE — 97110 THERAPEUTIC EXERCISES: CPT | Mod: GP | Performed by: PHYSICAL THERAPIST

## 2023-02-21 PROCEDURE — 97112 NEUROMUSCULAR REEDUCATION: CPT | Mod: GP | Performed by: PHYSICAL THERAPIST

## 2023-02-22 NOTE — PROGRESS NOTES
"Subjective:  The history is provided by the patient.     Physical Exam                    Objective:  Standing Alignment:        Lumbar:  Lordosis decr and posterior pelvic tilt (Forward flexed trunk)            Gait:  Patient ambulates independently with a wide base of support, short shuffling gait  Assistive Devices:  None                 Lumbar/SI Evaluation    Lumbar Myotomes:    T12-L3 (Hip Flex):  Left: 4    Right: 4  L2-4 (Quads):  Left:  4    Right:  4  L4 (Ankle DF):  Left:  4    Right:  4  L5 (Great Toe Ext): Left: 4    Right: 4   S1 (Toe Raise):  Left: 4    Right: 4      Lumbar Dermtomes:          L3 Left:  Hypo-light touch     L3 Right:  Hypo-light touch  L4 Left:  Hypo-light touch       L4 Right:  Hypo-light touch  L5 Left:  Hypo-light touch     L5 Right:  Hypo-light touch  S1 Left:  Hypo-light touch     S1 Right:  Hypo-light touch                                                         General     ROS    Assessment/Plan:    PROGRESS  REPORT    Progress reporting period is from 02/08/2023 to 02/21/2023.       SUBJECTIVE  Subjective changes noted by patient: The patient presents to the clinic ambulating independently, notes that he is able to perform transfers independently, and feels he is getting stronger. the patient's primary concern is altered sensation in his lowered extremity, noting high anxiety related to \"feeling like my legs are wrapped in bubble wrap\" patient also notes mental health concerns stating he has felt down and more easily agitated since his surgery.    Current pain level is 8/10 Current Pain level: 8/10.     Previous pain level was  8/10 Initial Pain level: 8/10.   Changes in function:  Yes (See Goal flowsheet attached for changes in current functional level)  Adverse reaction to treatment or activity: None    OBJECTIVE  Changes noted in objective findings:  Yes,   Objective: sit<>stand x5 with proper form, supine<>sit x5 with proper form, ambulates 1000 feet independently with " forward flexed trunk. Patient requires frequent redirection to participate in therapy session     ASSESSMENT/PLAN  Updated problem list and treatment plan: Diagnosis 1:  Low back pain, Surgical aftercare for cauda equina compression.  Pain -  electric stimulation, manual therapy, self management, education and home program  Decreased ROM/flexibility - manual therapy and therapeutic exercise  Decreased strength - therapeutic exercise and therapeutic activities  Impaired balance - neuro re-education and therapeutic activities  Impaired gait - gait training  Impaired muscle performance - neuro re-education  Decreased function - therapeutic activities  Impaired posture - neuro re-education  STG/LTGs have been met or progress has been made towards goals:  Yes (See Goal flow sheet completed today.)  Assessment of Progress: The patient's condition is improving.  Self Management Plans:  Patient has been instructed in a home treatment program.  I have re-evaluated this patient and find that the nature, scope, duration and intensity of the therapy is appropriate for the medical condition of the patient.  Richard continues to require the following intervention to meet STG and LTG's:  PT    Recommendations:  This patient would benefit from continued therapy.     Frequency:  1 X week, once daily  Duration:  for 10 weeks        Please refer to the daily flowsheet for treatment today, total treatment time and time spent performing 1:1 timed codes.

## 2023-02-28 ENCOUNTER — OFFICE VISIT (OUTPATIENT)
Dept: NEUROSURGERY | Facility: CLINIC | Age: 63
End: 2023-02-28
Payer: OTHER MISCELLANEOUS

## 2023-02-28 VITALS — OXYGEN SATURATION: 95 % | SYSTOLIC BLOOD PRESSURE: 137 MMHG | HEART RATE: 70 BPM | DIASTOLIC BLOOD PRESSURE: 81 MMHG

## 2023-02-28 DIAGNOSIS — M54.41 ACUTE BILATERAL LOW BACK PAIN WITH BILATERAL SCIATICA: Primary | ICD-10-CM

## 2023-02-28 DIAGNOSIS — F32.A DEPRESSION, UNSPECIFIED DEPRESSION TYPE: ICD-10-CM

## 2023-02-28 DIAGNOSIS — M54.42 ACUTE BILATERAL LOW BACK PAIN WITH BILATERAL SCIATICA: Primary | ICD-10-CM

## 2023-02-28 PROCEDURE — 99024 POSTOP FOLLOW-UP VISIT: CPT | Performed by: SURGERY

## 2023-02-28 RX ORDER — PREGABALIN 75 MG/1
75 CAPSULE ORAL 3 TIMES DAILY
Qty: 90 CAPSULE | Refills: 1 | Status: SHIPPED | OUTPATIENT
Start: 2023-02-28

## 2023-02-28 NOTE — NURSING NOTE
"Richard Hickman is a 63 year old male who presents for:  Chief Complaint   Patient presents with     Surgical Followup     Pain in legs        Initial Vitals:  /81   Pulse 70   SpO2 95%  Estimated body mass index is 30.39 kg/m  as calculated from the following:    Height as of 12/1/22: 5' 10\" (1.778 m).    Weight as of 12/8/22: 211 lb 12.8 oz (96.1 kg).. There is no height or weight on file to calculate BSA. BP completed using cuff size: regular  Data Unavailable        Dalton Poe"

## 2023-02-28 NOTE — LETTER
2/28/2023         RE: Richard Hickman  [address]  [place] [address]        Dear Colleague,    Thank you for referring your patient, Richard Hickman, to the [facility]. Please see a copy of my visit note below.    NEUROSURGERY FOLLOWUP  NOTE    Richard Hickman comes today in f/u.  Presented with cauda equina syndrome prior to surgery. S/p lumbar 2-3 and lumbar 4-5 matti laminectomies, medial facetectomies foraminotomies with bilateral micro discectomies at lumbar 4-5.     Pain is better since surgery. Everything above the knees including leg paresthesias, erectile dysfunction, saddle anesthesia etc have improved.  Feels paresthesias below the knees have not. Not diabetic. Feet are numb.   Strength feels good Medrol dose pack and gabapentin not helping much.    Physical therapy - continue   Having mood issues with gabapentin. Discontinue. Start lyrica.   Feeling depressed. Psychology consult placed.   Recommend Mri lumbar wwo contrast. If no significant findings on MRI recommend bilateral LUE EMG.    Mallory Natarajan MD      CC:     No Ref-Primary, Physician  No address on file        Again, thank you for allowing me to participate in the care of your patient.        Sincerely,        Mallory Natarajan MD

## 2023-02-28 NOTE — PATIENT INSTRUCTIONS
MRI - they will call you to schedule  EMG - they will call you to schedule   Mental health - they will call you to schedule     Stop Gabapentin  Switch to Lyrica. Can switch today. Ask pharmacy for further direction. Let us know if you have questions or concerns about this medication.     Stay off work until we see you back after MRI, EMG, mental health evaluation

## 2023-02-28 NOTE — PROGRESS NOTES
NEUROSURGERY FOLLOWUP  NOTE    Richard Hickman comes today in f/u.  Presented with cauda equina syndrome prior to surgery. S/p lumbar 2-3 and lumbar 4-5 matti laminectomies, medial facetectomies foraminotomies with bilateral micro discectomies at lumbar 4-5.     Pain is better since surgery. Everything above the knees including leg paresthesias, erectile dysfunction, saddle anesthesia etc have improved.  Feels paresthesias below the knees have not. Not diabetic. Feet are numb.   Strength feels good Medrol dose pack and gabapentin not helping much.    Physical therapy - continue   Having mood issues with gabapentin. Discontinue. Start lyrica.   Feeling depressed. Psychology consult placed.   Recommend Mri lumbar wwo contrast. If no significant findings on MRI recommend bilateral LUE EMG.    Mallory Natarajan MD      CC:     No Ref-Primary, Physician  No address on file

## 2023-03-08 ENCOUNTER — HOSPITAL ENCOUNTER (OUTPATIENT)
Dept: MRI IMAGING | Facility: HOSPITAL | Age: 63
Discharge: HOME | End: 2023-03-08
Attending: NURSE PRACTITIONER | Admitting: NURSE PRACTITIONER
Payer: OTHER MISCELLANEOUS

## 2023-03-08 ENCOUNTER — OFFICE VISIT (OUTPATIENT)
Dept: BEHAVIORAL HEALTH | Facility: CLINIC | Age: 63
End: 2023-03-08
Payer: COMMERCIAL

## 2023-03-08 ENCOUNTER — THERAPY VISIT (OUTPATIENT)
Dept: PHYSICAL THERAPY | Facility: CLINIC | Age: 63
End: 2023-03-08
Payer: OTHER MISCELLANEOUS

## 2023-03-08 DIAGNOSIS — M54.42 ACUTE BILATERAL LOW BACK PAIN WITH BILATERAL SCIATICA: ICD-10-CM

## 2023-03-08 DIAGNOSIS — G83.4 CAUDA EQUINA COMPRESSION (H): Primary | ICD-10-CM

## 2023-03-08 DIAGNOSIS — F43.25 ADJUSTMENT DISORDER WITH MIXED DISTURBANCE OF EMOTIONS AND CONDUCT: Primary | ICD-10-CM

## 2023-03-08 DIAGNOSIS — M54.41 ACUTE BILATERAL LOW BACK PAIN WITH BILATERAL SCIATICA: ICD-10-CM

## 2023-03-08 PROCEDURE — 90839 PSYTX CRISIS INITIAL 60 MIN: CPT | Performed by: SOCIAL WORKER

## 2023-03-08 PROCEDURE — 72148 MRI LUMBAR SPINE W/O DYE: CPT

## 2023-03-08 PROCEDURE — 99207 PR NO CHARGE LOS: CPT | Mod: GP | Performed by: PHYSICAL THERAPIST

## 2023-03-08 NOTE — PROGRESS NOTES
[facility]: : Integrated Behavioral Health  March 8, 2023      Behavioral Health Clinician Progress Note    Patient Name: Richard Hickman           Service Type:  Individual      Service Location:   Face to Face in Clinic     Session Start Time: 3:18pm  Session End Time: 4:11pm      Session Length: 38 - 52      Attendees: Client PT staff member was in room for most of the time with pt's permission     Service Modality:  In-person    Visit Activities (Refresh list every visit): [facility] and [facility] Only    Diagnostic Assessment Date: Will be created in the first four sessions    Treatment Plan Review Date: Provider and patient will continue to build rapport and discuss tx goals in their next few sessions.     See Flowsheets for today's PHQ-9 and GEOVANNA-7 results  Previous PHQ-9:   PHQ-9 SCORE 12/1/2022   PHQ-9 Total Score 17     Previous GEOVANNA-7: No flowsheet data found.    LETICIA LEVEL:  No flowsheet data found.    DATA  Extended Session (60+ minutes): No  Interactive Complexity: No  Crisis: Yes, visit entailed Crisis Management / Stabilization requiring urgent assessment and history of the crisis state, mental status exam and disposition  [facility] Patient: No    Treatment Objective(s) Addressed in This Session:  Target Behavior(s): anxiety, stress, suicidal/homicidal, chronic pain     Adjustment Difficulties: will develop coping/problem-solving skills to facilitate more adaptive adjustment    Current Stressors / Issues:    Provider did NOT have access to pt's chart during his visit for review, documentation was completed after visit.     Received a warm hand off from Honorio Lee, Clinical supervisor to meet with pt due to safety concerns expressed to his physical therapist. Pt was escalated throughout the session and explains some background information on his injury he had through work comp and has been having significant complications with. Pt shares that his mental health is not going  "well due to his injuries and feeling like he is being mistreated by workers comp employee. Pt shares that he would like to see a psychiatrist but was told by his workers comp provider that this was not allowed, despite having documents stating this was a service he could access. He is feel very frustrated that he is physically and mentally struggling and doesn't feel like people are listening to him.    Provider was pulled into todays visit due to the pt stating that he wanted to shoot and kill the workers comp employee and then kill himself. When asking the pt directly about these statements, he states \"I'm not actually going to do that and I didn't mean that, I just want help and no one has been listening to me.\"  Pt denies hx of suicidal thoughts or attempts. Denies he has access to firearms. Again he reiterates that he does not plan on hurting anyone or himself. He is frustrated and doesn't know how to get his needs met. Discussed the options for psychiatry, pt states that he wants to be seen today or tomorrow. Unfortunately this time is not realistic and provider suggested using the Empath unit for some immediate support. Pt states he has baldo been sleeping, feeling more irritable, feeling down, overwhelmed and frustrated. Pt was agreeable to using the empath unit today, his wife is agreeable to drive him there after his visit today. Pt contracts that he can safely drive home to her before they go there, he lives near the clinic. Wife is aware of the plan. Pt was in a calm state when leaving and expressed gratitude for support and help. Provider will be checking in via phone with the pt tomorrow with his permission. Pt would like mental health support during this difficult time, provider will schedule with him tomorrow if he would like while they check in.       Progress on Treatment Objective(s) / Homework:  New Objective established this session - PREPARATION (Decided to change - considering how); Intervened " by negotiating a change plan and determining options / strategies for behavior change, identifying triggers, exploring social supports, and working towards setting a date to begin behavior change    Motivational Interviewing    MI Intervention: Expressed Empathy/Understanding, Supported Autonomy, Collaboration, Evocation, Permission to raise concern or advise, Open-ended questions and Reflections: simple and complex     Change Talk Expressed by the Patient: Reasons to change Need to change    Provider Response to Change Talk: E - Evoked more info from patient about behavior change and A - Affirmed patient's thoughts, decisions, or attempts at behavior change      Care Plan review completed: No-provider offered crisis services today    Medication Review:  No changes to current psychiatric medication(s)     Current Outpatient Medications   Medication     acetaminophen (TYLENOL) 325 MG tablet     amLODIPine (NORVASC) 5 MG tablet     aspirin (ASA) 81 MG chewable tablet     atorvastatin (LIPITOR) 20 MG tablet     cholecalciferol, vitamin D3, 1,000 unit (25 mcg) tablet     gabapentin (NEURONTIN) 300 MG capsule     ibuprofen (ADVIL/MOTRIN) 800 MG tablet     methocarbamol (ROBAXIN) 750 MG tablet     methylPREDNISolone (MEDROL DOSEPAK) 4 MG tablet therapy pack     OMEGA-3/DHA/EPA/FISH OIL (FISH OIL-OMEGA-3 FATTY ACIDS) 300-1,000 mg capsule     pregabalin (LYRICA) 75 MG capsule     No current facility-administered medications for this visit.         Medication Compliance:  NA    Changes in Health Issues:   None reported    Chemical Use Review:   Substance Use: Not reviewed due to pt in crisis      Tobacco Use: Not reviewed due to pt in crisis     Assessment: Current Emotional / Mental Status (status of significant symptoms):  Risk status (Self / Other harm or suicidal ideation)  Patient denies a history of suicidal ideation, suicide attempts, self-injurious behavior, homicidal ideation, homicidal behavior and and other safety  concerns  Patient denies current fears or concerns for personal safety.  Patient reports the following current or recent suicidal ideation or behaviors: Pt did state that he wanted to kill a workers comp employee and then himself- when addressed by [facility], pt denies he wants to do this and will not do this but feels very frusterated with his situation.  Patient reports current or recent homicidal ideation or behaviors including Told PT provider that he wanted to shoot and kill workers comp provider   Patient denies current or recent self injurious behavior or ideation.  Patient denies other safety concerns.  A safety and risk management plan has not been developed at this time, however patient was encouraged to call [place] [phone] should there be a change in any of these risk factors.    Appearance:   Appropriate   Eye Contact:   Good   Psychomotor Behavior: Agitated   Attitude:   Cooperative  Hostile  Orientation:   All  Speech   Rate / Production: Hyperverbal  Emotional Talkative   Volume:  Loud   Mood:    Elevated  Irritable  Agitated  Affect:    Worrisome   Thought Content:  Homicidal  Rumination  Suicidal  Thought Form:  Coherent  Goal Directed  Circumstantial  Insight:    Fair     Diagnoses:  1. Adjustment disorder with mixed disturbance of emotions and conduct        Collateral Reports Completed:  Routed note to PCP    Plan: (Homework, other):  Patient was given information about behavioral services and encouraged to schedule a follow up appointment with the clinic [facility] tomorrow.  He was also given information about mental health symptoms and treatment options .  CD Recommendations: were not addressed in todays session.   Josefa Humphreys, [facility] Center[facility]          ______________________________________________________________________    Integrated Primary Care Behavioral Health Treatment Plan    Patient's Name: Richard Hickman  YOB: 1960    Date of Creation: will be created in the first few sessions      Date  Treatment Plan Last Reviewed/Revised: TBD    DSM5 Diagnoses: Adjustment disorder with mixed disturbance of emotions and conduct   Psychosocial / Contextual Factors: Has chronic pain, not knowing all information of his healthcare, stress with workers comp, strained relationships, not feeling like self.   PROMIS (reviewed every 90 days): Was not completed due to crisis appt     Referral / Collaboration:  will be discussed in follow up conversation.    Anticipated number of session for this episode of care: 10+  Anticipation frequency of session: Weekly  Anticipated Duration of each session: 38-52 minutes  Treatment plan will be reviewed in 90 days or when goals have been changed.       Patient has reviewed and agreed to the above plan.      Josefa Humphreys, [facility] Hospital[facility]  March 8, 2023     [Monogamous] : monogamous [Sometimes] : sometimes [Vaginal] : vaginal [Male ___] : [unfilled] male [Date of most recent sexual encounter ___] : ~His/Her~ most recent sexual encounter was [unfilled] [Partner Aware?] : Partner Aware? Yes [Partnership for Health – Safe Sex Evidence Based Intervention] : The Partnership for Health Safe Sex Evidence Based Intervention was applied [Loss Frame Message] :  and a loss frame message was provided. [de-identified] : Partner has HIV\par Pt has h/o HSV

## 2023-03-09 ENCOUNTER — HOSPITAL ENCOUNTER (EMERGENCY)
Facility: CLINIC | Age: 63
Discharge: LEFT BEFORE DISCHARGE | End: 2023-03-09
Attending: EMERGENCY MEDICINE | Admitting: EMERGENCY MEDICINE
Payer: COMMERCIAL

## 2023-03-09 ENCOUNTER — TELEPHONE (OUTPATIENT)
Dept: BEHAVIORAL HEALTH | Facility: CLINIC | Age: 63
End: 2023-03-09
Payer: COMMERCIAL

## 2023-03-09 VITALS
HEART RATE: 96 BPM | OXYGEN SATURATION: 98 % | HEIGHT: 70 IN | RESPIRATION RATE: 18 BRPM | SYSTOLIC BLOOD PRESSURE: 173 MMHG | WEIGHT: 210 LBS | TEMPERATURE: 97.5 F | DIASTOLIC BLOOD PRESSURE: 85 MMHG | BODY MASS INDEX: 30.06 KG/M2

## 2023-03-09 DIAGNOSIS — R45.851 SUICIDAL IDEATION: ICD-10-CM

## 2023-03-09 PROCEDURE — 99282 EMERGENCY DEPT VISIT SF MDM: CPT

## 2023-03-09 ASSESSMENT — ACTIVITIES OF DAILY LIVING (ADL): ADLS_ACUITY_SCORE: 35

## 2023-03-09 ASSESSMENT — ENCOUNTER SYMPTOMS: SLEEP DISTURBANCE: 1

## 2023-03-09 NOTE — ED PROVIDER NOTES
"  History     Chief Complaint:  Depression       The history is provided by the patient.      Richard Hickman is a 63 year old male who presents with suicidal ideation for the past few months. He has a plan to use a gun to shoot himself to stop his pain. Denies homicidal ideation. He had a back injury 10/31 requiring surgery. He went to [facility], who recommended surgery in the next 72 hours. He asked for a second opinion and felt his care became poorer thereafter. He saw a second surgeon, who he felt was more honest with him, and she performed his surgery. He successfully received worker's compensation, but had issues with his case supervisors becoming upset with his doctor because they felt she should be seeing him sooner than she planned. He learned from a friend that worker's comp entitled him to psychiatric care and was upset he had not been informed of this previously. He then requested psychiatric and was denied. This is when his mental health became very poor. He states major stressors include pain and numbness of his legs below the knees, and sleeping on the floor for the past few months rather than in a bed. He has been unable to have sex with his wife. He also reports adverse effects from medications he started following surgery, including shaking and \"climbing up the wall.\" He feels his friends and family are not understanding of his mental health concerns. He states he feels like he's being \"punished for being hurt.\"     Independent Historian:   None - Patient Only    Review of External Notes:  Reviewed discharge summary dated 12/8/2022 through 12/10/2022: Discharge diagnosis was cauda equina syndrome/compression.  Underwent multilevel lumbar hemilaminectomies.  No reported surgical complications.    ROS:  Review of Systems   Psychiatric/Behavioral: Positive for sleep disturbance and suicidal ideas.   All other systems reviewed and are negative.    Allergies:  No Known Allergies     Medications:    Norvasc " "  Aspirin 81 mg   Lipitor  Neurontin  Robaxin   Medrol  Lyrica    Past Medical History:    Hypertension   Hyperlipidemia   Lumbar radiculopathy  Mass of testis    Past Surgical History:    Appendectomy   Laminectomy lumbar, two levels, bilateral      Social History:  The patient presents to the ED alone via private vehicle.  PCP: No Ref-Primary, Physician     Physical Exam     Patient Vitals for the past 24 hrs:   BP Temp Temp src Pulse Resp SpO2 Height Weight   03/09/23 1058 (!) 173/85 97.5  F (36.4  C) Temporal 96 18 98 % 1.778 m (5' 10\") 95.3 kg (210 lb)        Physical Exam  SKIN:  Warm, dry.  Lumbar incision is noninflamed and well-healed.  HEMATOLOGIC/IMMUNOLOGIC/LYMPHATIC:  No pallor.  EYES:  Conjunctivae normal.  CARDIOVASCULAR:  Regular rate and rhythm.  RESPIRATORY:  No respiratory distress, breath sounds equal and normal.  GASTROINTESTINAL:  Soft, nontender abdomen.  MUSCULOSKELETAL: Normal body habitus.  Moving his torso well.  NEUROLOGIC:  Alert, conversant.  Stable gait.  PSYCHIATRIC: Anxious mood, at times tearful.  Cooperative.  No psychotic features.      Emergency Department Course   Emergency Department Course & Assessments:  ED Course as of 03/09/23 1707   Thu Mar 09, 2023   1204 I obtained the history and examined the patient as noted above.            Independent Interpretation (X-rays, CTs, rhythm strip):  N/A    Consultations/Discussion of Management or Tests:  None    Social Determinants of Health affecting care:  Employment/Unemployment. He was injured at work and is now unemployed on worker's compensation.    Disposition:  Patient eloped.    Impression & Plan    CMS Diagnoses: None    Medical Decision Making:  This patient presents concerned about his mental health.  He came in voluntarily to pursue treatment.  I assessed the patient and planned on a mental health evaluation by [facility] or an empath.  The patient then eloped from the emergency department.  He had not yet been placed on a hold.  " With concern of his suicidal ideation and thoughts of using a gun as a method of suicide I had the nurse call police to try to track the patient down to have him assessed.  During the remainder of my shift the patient did not return.    Diagnosis:    ICD-10-CM    1. Suicidal ideation  R45.851             Scribe Disclosure:  I, Virgie Hernandez, am serving as a scribe at 1:14 PM on 3/9/2023 to document services personally performed by Tate Watson MD based on my observations and the provider's statements to me.     3/9/2023   Tate Watson MD Moe, James Thomas, MD  03/09/23 8851

## 2023-03-09 NOTE — ED NOTES
Patient got in an accident in October , they told him he needed to have an urgent surgery for Cauda Equina, he visited [facility] and scheduled surgery, but then he asked [facility] if he could have another opinion and [facility] got mad at him. The day of surgery they cancelled his surgery.

## 2023-03-09 NOTE — ED TRIAGE NOTES
Depression - at times suicidal thoughts - pt had back surgery on 12/8 - pt having lots of stress since with the pain and workers comp.   Pt wanting to talk with someone needing help        Triage Assessment     Row Name 03/09/23 1051       Triage Assessment (Adult)    Airway WDL WDL       Respiratory WDL    Respiratory WDL WDL       Cardiac WDL    Cardiac WDL WDL       Cognitive/Neuro/Behavioral WDL    Cognitive/Neuro/Behavioral WDL WDL

## 2023-03-09 NOTE — TELEPHONE ENCOUNTER
Reached out to pt to follow up on his visit yesterday. Pt was appreactive of the call and states that he tried to leave the provider a message yesterday after the appt. He and his wife called Empath unit and were informed that it would be a 5 hour wait if he went in so he decided to wait and go in today. Pt states that he is currently in the car with his wife on his way to the Empath unit. Pt was very appreciative of the support provided during the visit yesterday and will be reaching out to the provider after empath to schedule a [facility] appt for ongoing support.

## 2023-03-09 NOTE — ED NOTES
Reported being depressed and overwhelmed because he had a surgery in October and things are not the same anymore.  He has been feeling sad and helpless because he has nerve pain and work compensation is not on his side.  Would like to talk to a therapist and see a psychiatrist . He denies any suicidal ideation, reported feeling better after talking to me and Doctor. I explained about the Empath unit and I continued explaining to him.  When I did his Covid swab patient said he did not have trust on that test, he said I am living now, since he was not on a hold I opened the door and left.

## 2023-03-13 ENCOUNTER — OFFICE VISIT (OUTPATIENT)
Dept: NEUROSURGERY | Facility: CLINIC | Age: 63
End: 2023-03-13
Payer: COMMERCIAL

## 2023-03-13 VITALS — SYSTOLIC BLOOD PRESSURE: 147 MMHG | OXYGEN SATURATION: 95 % | HEART RATE: 70 BPM | DIASTOLIC BLOOD PRESSURE: 83 MMHG

## 2023-03-13 DIAGNOSIS — R20.2 NUMBNESS AND TINGLING OF LEG: ICD-10-CM

## 2023-03-13 DIAGNOSIS — R20.0 NUMBNESS AND TINGLING OF LEG: ICD-10-CM

## 2023-03-13 DIAGNOSIS — F48.9 MENTAL HEALTH PROBLEM: Primary | ICD-10-CM

## 2023-03-13 PROCEDURE — 99213 OFFICE O/P EST LOW 20 MIN: CPT | Performed by: NURSE PRACTITIONER

## 2023-03-13 ASSESSMENT — PAIN SCALES - GENERAL: PAINLEVEL: WORST PAIN (10)

## 2023-03-13 NOTE — PROGRESS NOTES
Neurosurgery Follow UP  March 13, 2023    A/P: Roberto Hickman is a 63 year old S/p lumbar 2-3 and lumbar 4-5 matti laminectomies, medial facetectomies foraminotomies with bilateral micro discectomies at lumbar 4-5 12/8/2022 with Dr Natarajan. MRI done for today's visit per Dr Natarajan for on going bilateral lower extremity symptoms  This image was not contrasted unfortunately, therefore inconclusive. Shared pictures with Roberto and his family. There does seem to be some improvement in stenosis at L4-5 to my view but difficult to determine degree of improvement without contrast. Discussed with Dr Natarajan and will repeat MRI with contrast as current MRI radiology read calls concern for on going stenosis.     Roberto states pain is much better than before surgery. States he could not walk before surgery. Now walking with a cane and limping. His on going symptoms include bilateral above the knee to bottom of the foot paresthesia's. He states his legs feel like they are wrapped in bubble wrap. Right leg worse than left. Left was worse than right before surgery. We discussed this may take 12-18 months to see improvement from time of surgery. He denies diabetes history. Will order EMG to check nerve conduction. He reports diminished penile sensation, similar to pre-op. He is able to have an erection. He does not have any bowel or bladder dysfunction. He did not have much change in symptoms with Medrol dose pack X2, gabapentin. He started Lyrica last week. Does not know if it helps. He is attending weekly PT and will continue. He was referred to psychiatry last time he was in our clinic and tells me his work comp denied paying and he does not have insurance otherwise to obtain mental health services. Social work referral sent today to help patient find resources. Also recommend he see his PCP again as they may have resources for him that I do not. He denies suicidal thoughts today. He feels he is being punished by work comp for  seeking a second opinion for spine surgery.  [facility] not present for visit today. Updated her via telephone once appt concluded at her request.     PLAN:   1. Mental health - see your PCP, social work consult placed today  2. EMG ordered   3. Contrasted MRI lumbar   4. Return to see Dr Natarajan after EMG, MRI     HPI: Presented 12/1/2022 with signs of cauda equina syndrome with leg weakness, numbness, saddle anesthesia and urinary retention. Able to have erection but no penile sensation. Unable to walk. Crawling around his home. Symptoms started 11/1/2022. MRI severe canal stenosis L2-3, L4-5. L2-3 ligamentous and facet hypertrophy in setting of a broad based disc contributing to severe stenosis. L4-5 large disc herniation severely impinging nerve roots. Recovered from opioid addiction. Was on suboxone up until 3 months ago.     Physical Exam  BP (!) 147/83   Pulse 70   SpO2 95%     General: alert, oriented. Mildly distressed.     Motor: normal bulk and tone     Strength:   Full strength bilateral LE     Sensation: diminished to pin prick bilateral LE; intact outer right shin/calve, intact posterior calf left     Reflexes: no clonus    Coordination: use of a cane for ambulation; leans to the side     Imaging: MRI   which shows Grade 1 anterolisthesis L2 on L3 measuring approximately 2 mm. Retrolisthesis L4 on L5 measuring 5 mm. Mixed type I and type II Modic endplate marrow change L4-L5. Normal distal spinal cord and cauda equina with conus medullaris at the L1 level. Partially imaged bilateral renal cysts. Unremarkable visualized bony pelvis. L2-L3: Severe spinal canal stenosis. Severe lateral recess stenosis. Mild bilateral foraminal stenosis with foraminal disc contacting the L2 nerve   roots. L3-L4: Hydrated disc. Minimal disc bulge. Facet degenerative change. Moderate acquired and congenital spinal canal stenosis. No right foraminal stenosis. Moderate to severe left foraminal stenosis, unchanged. Foraminal disc  contacts the left L3 nerve root with mass effect which is mildly flattened. L4-L5: Laminotomy defect noted. Changes of the microdiscectomy. No disc bulge. Facet degenerative change. Mild to moderate acquired and congenital spinal canal stenosis. Spinal canal patency is improved from the prior exam. Severe right foraminal stenosis with impingement of the right L4 nerve root, unchanged. Moderate left foraminal stenosis with abutment of the left L4 nerve root. L5-S1: Normal disc height and signal. No herniation. Facet degenerative change. No spinal canal or neural foraminal stenosis.    Gloria Blood, GIO-CNP  [facility]  O: [phone]

## 2023-03-13 NOTE — LETTER
3/13/2023         RE: Richard Hickman  [address]  [place] [address]        Dear Colleague,    Thank you for referring your patient, Richard Hickman, to the [facility] SPINE AND NEUROSURGERY. Please see a copy of my visit note below.    Neurosurgery Follow UP  March 13, 2023    A/P: Roberto Hickman is a 63 year old S/p lumbar 2-3 and lumbar 4-5 matti laminectomies, medial facetectomies foraminotomies with bilateral micro discectomies at lumbar 4-5 12/8/2022 with Dr Natarajan. MRI done for today's visit per Dr Natarajan for on going bilateral lower extremity symptoms  This image was not contrasted unfortunately, therefore inconclusive. Shared pictures with Roberto and his family. There does seem to be some improvement in stenosis at L4-5 to my view but difficult to determine degree of improvement without contrast. Discussed with Dr Natarajan and will repeat MRI with contrast as current MRI radiology read calls concern for on going stenosis.     Roberto states pain is much better than before surgery. States he could not walk before surgery. Now walking with a cane and limping. His on going symptoms include bilateral above the knee to bottom of the foot paresthesia's. He states his legs feel like they are wrapped in bubble wrap. Right leg worse than left. Left was worse than right before surgery. We discussed this may take 12-18 months to see improvement from time of surgery. He denies diabetes history. Will order EMG to check nerve conduction. He reports diminished penile sensation, similar to pre-op. He is able to have an erection. He does not have any bowel or bladder dysfunction. He did not have much change in symptoms with Medrol dose pack X2, gabapentin. He started Lyrica last week. Does not know if it helps. He is attending weekly PT and will continue. He was referred to psychiatry last time he was in our clinic and tells me his work comp denied paying and he does not have insurance otherwise to obtain  mental health services. Social work referral sent today to help patient find resources. Also recommend he see his PCP again as they may have resources for him that I do not. He denies suicidal thoughts today. He feels he is being punished by work comp for seeking a second opinion for spine surgery.  QRC not present for visit today. Updated her via telephone once appt concluded at her request.     PLAN:   1. Mental health - see your PCP, social work consult placed today  2. EMG ordered   3. Contrasted MRI lumbar   4. Return to see Dr Natarajan after EMG, MRI     HPI: Presented 12/1/2022 with signs of cauda equina syndrome with leg weakness, numbness, saddle anesthesia and urinary retention. Able to have erection but no penile sensation. Unable to walk. Crawling around his home. Symptoms started 11/1/2022. MRI severe canal stenosis L2-3, L4-5. L2-3 ligamentous and facet hypertrophy in setting of a broad based disc contributing to severe stenosis. L4-5 large disc herniation severely impinging nerve roots. Recovered from opioid addiction. Was on suboxone up until 3 months ago.     Physical Exam  BP (!) 147/83   Pulse 70   SpO2 95%     General: alert, oriented. Mildly distressed.     Motor: normal bulk and tone     Strength:   Full strength bilateral LE     Sensation: diminished to pin prick bilateral LE; intact outer right shin/calve, intact posterior calf left     Reflexes: no clonus    Coordination: use of a cane for ambulation; leans to the side     Imaging: MRI   which shows Grade 1 anterolisthesis L2 on L3 measuring approximately 2 mm. Retrolisthesis L4 on L5 measuring 5 mm. Mixed type I and type II Modic endplate marrow change L4-L5. Normal distal spinal cord and cauda equina with conus medullaris at the L1 level. Partially imaged bilateral renal cysts. Unremarkable visualized bony pelvis. L2-L3: Severe spinal canal stenosis. Severe lateral recess stenosis. Mild bilateral foraminal stenosis with foraminal disc  contacting the L2 nerve   roots. L3-L4: Hydrated disc. Minimal disc bulge. Facet degenerative change. Moderate acquired and congenital spinal canal stenosis. No right foraminal stenosis. Moderate to severe left foraminal stenosis, unchanged. Foraminal disc contacts the left L3 nerve root with mass effect which is mildly flattened. L4-L5: Laminotomy defect noted. Changes of the microdiscectomy. No disc bulge. Facet degenerative change. Mild to moderate acquired and congenital spinal canal stenosis. Spinal canal patency is improved from the prior exam. Severe right foraminal stenosis with impingement of the right L4 nerve root, unchanged. Moderate left foraminal stenosis with abutment of the left L4 nerve root. L5-S1: Normal disc height and signal. No herniation. Facet degenerative change. No spinal canal or neural foraminal stenosis.    GOI Brennan-CNP  [facility] Neurosurgery  O: [phone]            Again, thank you for allowing me to participate in the care of your patient.        Sincerely,        LINNEA Alamo CNP

## 2023-03-13 NOTE — PATIENT INSTRUCTIONS
EMG ordered today (nerve test for your legs) - the  will schedule   Continue physical therapy   Continue the Lyrica - we can adjust the dose if you feel its helpful   Social work referral  was sent - they can help you with insurance and or mental health services   Your primary care provider can also help you with mental health management   I will call you later today after I hear from Dr Natarajan about your MRI

## 2023-03-15 ENCOUNTER — THERAPY VISIT (OUTPATIENT)
Dept: PHYSICAL THERAPY | Facility: CLINIC | Age: 63
End: 2023-03-15
Payer: OTHER MISCELLANEOUS

## 2023-03-15 DIAGNOSIS — M54.41 ACUTE BILATERAL LOW BACK PAIN WITH BILATERAL SCIATICA: Primary | ICD-10-CM

## 2023-03-15 DIAGNOSIS — Z47.89 ENCOUNTER FOR OTHER ORTHOPEDIC AFTERCARE: ICD-10-CM

## 2023-03-15 DIAGNOSIS — M54.42 ACUTE BILATERAL LOW BACK PAIN WITH BILATERAL SCIATICA: Primary | ICD-10-CM

## 2023-03-15 PROCEDURE — 97110 THERAPEUTIC EXERCISES: CPT | Mod: GP | Performed by: PHYSICAL THERAPIST

## 2023-03-15 PROCEDURE — 97116 GAIT TRAINING THERAPY: CPT | Mod: GP | Performed by: PHYSICAL THERAPIST

## 2023-03-16 ENCOUNTER — PATIENT OUTREACH (OUTPATIENT)
Dept: CARE COORDINATION | Facility: CLINIC | Age: 63
End: 2023-03-16
Payer: COMMERCIAL

## 2023-03-16 NOTE — PROGRESS NOTES
Clinic Care Coordination Contact  [facility]/Voicemail       Clinical Data: Care Coordinator Outreach  Outreach attempted x 2.  First attempt took place on 3/14. Left message on patient's voicemail with call back information and requested return call.  Plan: Care Coordinator will send care coordination introduction letter with care coordinator contact information and explanation of care coordination services via mail. Care Coordinator will do no further outreaches at this time.    AWILDA Anna  Primary Care Clinic- Social Work Care Coordinator  [facility] and [facility]  Ph: [phone]  3/16/2023 11:58 AM

## 2023-03-16 NOTE — LETTER
[facility] CARE COORDINATION  [address] [place] [address]    March 16, 2023    Richard Hickman  [address]  [place] [address]      Dear Richard,        I am a clinic care coordinator who works with Physician Tayler Ref-Primary with the [facility]. I have been trying to reach you recently to introduce Clinic Care Coordination. Below is a description of clinic care coordination and how I can further assist you.       The clinic care coordination team is made up of a registered nurse, , financial resource worker and community health worker who understand the health care system. The goal of clinic care coordination is to help you manage your health and improve access to the health care system. Our team works alongside your provider to assist you in determining your health and social needs. We can help you obtain health care and community resources, providing you with necessary information and education. We can work with you through any barriers and develop a care plan that helps coordinate and strengthen the communication between you and your care team.    Please feel free to contact me with any questions or concerns regarding care coordination and what we can offer.      We are focused on providing you with the highest-quality healthcare experience possible.    Sincerely,     Tushar Workman, [facility]  Primary Care Clinic- Social Work Care Coordinator  [facility] [place] and [facility]  Ph: [phone]      Enclosed: I have enclosed a copy of the Patient Centered Plan of Care. This has helpful information and goals that we have talked about. Please keep this in an easy to access place to use as needed.

## 2023-03-22 ENCOUNTER — THERAPY VISIT (OUTPATIENT)
Dept: PHYSICAL THERAPY | Facility: CLINIC | Age: 63
End: 2023-03-22
Payer: OTHER MISCELLANEOUS

## 2023-03-22 ENCOUNTER — TELEPHONE (OUTPATIENT)
Dept: NEUROSURGERY | Facility: CLINIC | Age: 63
End: 2023-03-22
Payer: COMMERCIAL

## 2023-03-22 DIAGNOSIS — Z47.89 ENCOUNTER FOR OTHER ORTHOPEDIC AFTERCARE: ICD-10-CM

## 2023-03-22 DIAGNOSIS — M54.41 ACUTE BILATERAL LOW BACK PAIN WITH BILATERAL SCIATICA: Primary | ICD-10-CM

## 2023-03-22 DIAGNOSIS — M54.42 ACUTE BILATERAL LOW BACK PAIN WITH BILATERAL SCIATICA: Primary | ICD-10-CM

## 2023-03-22 PROCEDURE — 97112 NEUROMUSCULAR REEDUCATION: CPT | Mod: GP | Performed by: PHYSICAL THERAPIST

## 2023-03-22 PROCEDURE — 97110 THERAPEUTIC EXERCISES: CPT | Mod: GP | Performed by: PHYSICAL THERAPIST

## 2023-03-22 PROCEDURE — 97140 MANUAL THERAPY 1/> REGIONS: CPT | Mod: GP | Performed by: PHYSICAL THERAPIST

## 2023-03-22 NOTE — TELEPHONE ENCOUNTER
[facility] Call Center    Phone Message    May a detailed message be left on voicemail: yes     Reason for Call: Other: Kaity Ca [facility] for Richard called she needs to confirm that MRI with contrast order was placed. She also needs a copy of his behavior health evaluation faxed to her at [phone]. Please call if any questions     Action Taken: Other: [facility] Neurosurgery    Travel Screening: Not Applicable

## 2023-03-22 NOTE — TELEPHONE ENCOUNTER
Called Kaity and instructed to connect with  Medical Records Dept to request our clinic notes - she verbalized agreement.  Taina Robertson RN, CNRN

## 2023-03-29 ENCOUNTER — THERAPY VISIT (OUTPATIENT)
Dept: PHYSICAL THERAPY | Facility: CLINIC | Age: 63
End: 2023-03-29
Payer: OTHER MISCELLANEOUS

## 2023-03-29 DIAGNOSIS — Z47.89 ENCOUNTER FOR OTHER ORTHOPEDIC AFTERCARE: ICD-10-CM

## 2023-03-29 DIAGNOSIS — M54.41 ACUTE BILATERAL LOW BACK PAIN WITH BILATERAL SCIATICA: Primary | ICD-10-CM

## 2023-03-29 DIAGNOSIS — M54.42 ACUTE BILATERAL LOW BACK PAIN WITH BILATERAL SCIATICA: Primary | ICD-10-CM

## 2023-03-29 PROCEDURE — 97530 THERAPEUTIC ACTIVITIES: CPT | Mod: GP | Performed by: PHYSICAL THERAPIST

## 2023-03-29 PROCEDURE — 97140 MANUAL THERAPY 1/> REGIONS: CPT | Mod: GP | Performed by: PHYSICAL THERAPIST

## 2023-03-29 PROCEDURE — 97110 THERAPEUTIC EXERCISES: CPT | Mod: GP | Performed by: PHYSICAL THERAPIST

## 2023-04-05 ENCOUNTER — THERAPY VISIT (OUTPATIENT)
Dept: PHYSICAL THERAPY | Facility: CLINIC | Age: 63
End: 2023-04-05
Payer: OTHER MISCELLANEOUS

## 2023-04-05 DIAGNOSIS — Z47.89 ENCOUNTER FOR OTHER ORTHOPEDIC AFTERCARE: ICD-10-CM

## 2023-04-05 DIAGNOSIS — M54.41 ACUTE BILATERAL LOW BACK PAIN WITH BILATERAL SCIATICA: Primary | ICD-10-CM

## 2023-04-05 DIAGNOSIS — M54.42 ACUTE BILATERAL LOW BACK PAIN WITH BILATERAL SCIATICA: Primary | ICD-10-CM

## 2023-04-05 PROCEDURE — 97110 THERAPEUTIC EXERCISES: CPT | Mod: GP | Performed by: PHYSICAL THERAPIST

## 2023-04-06 NOTE — PROGRESS NOTES
Subjective:  HPI  Physical Exam                    Objective:  Standing Alignment:    Cervical/Thoracic:  Forward head and thoracic kyphosis increased    Lumbar:  Lordosis decr and posterior pelvic tilt            Gait:  The patient ambulated with a cane on his L side, heavy reliance on his cane,  with a shortened step length L shorter than R. Ambulated forward flexed at the trunk                   Lumbar/SI Evaluation          Lumbar Dermtomes:        L2 Left:  Normal-light touch     L2 Right:  Normal-light touch  L3 Left:  Normal-light touch     L3 Right:  Normal-light touch  L4 Left:  Hypo-light touch       L4 Right:  Normal-light touch  L5 Left:  Hypo-light touch     L5 Right:  Hypo-light touch  S1 Left:  Hypo-light touch     S1 Right:  Hypo-light touch                                                         General     ROS    Assessment/Plan:    PROGRESS  REPORT    Progress reporting period is from 02/08/2023 to 04/06/2023.       SUBJECTIVE  Subjective changes noted by patient: The patient presents to the clinic stating he has new sensation in his left lower extremity, stating that during heel slides he is now able to feel his foot touching the table. he notes disatisfaction with the lack of feeling on his right lower extremity, and additionally states that he is concerned that he is required to see a new neutral provider on the 25th. Notes continued lack of sensation in his groin area, states that he is able to urinate standing up while standing on his left leg.      Current pain level is  Current Pain level: 4/10.     Previous pain level was   8/10.   Changes in function:  Yes (See Goal flowsheet attached for changes in current functional level)  Adverse reaction to treatment or activity: None    OBJECTIVE  Changes noted in objective findings:  Yes,   Objective: sit<>stand x3 independently without UE support , supine <>sit x2 independently    ASSESSMENT/PLAN  Updated problem list and treatment plan: Diagnosis  1:  Acute bilateral low back pain with bilateral sciatica Pain -  hot/cold therapy, electric stimulation, manual therapy, self management, education and home program  Decreased ROM/flexibility - manual therapy and therapeutic exercise  Decreased strength - therapeutic exercise and therapeutic activities  Impaired gait - gait training  Impaired muscle performance - neuro re-education  Decreased function - therapeutic activities  Impaired posture - neuro re-education  STG/LTGs have been met or progress has been made towards goals:  Yes (See Goal flow sheet completed today.)  Assessment of Progress: The patient's condition has potential to improve.  Self Management Plans:  Patient has been instructed in a home treatment program.  I have re-evaluated this patient and find that the nature, scope, duration and intensity of the therapy is appropriate for the medical condition of the patient.  Richard continues to require the following intervention to meet STG and LTG's:  PT    Recommendations:  This patient would benefit from continued therapy.     Frequency:  1 X week, once daily  Duration:  for 8 weeks        Please refer to the daily flowsheet for treatment today, total treatment time and time spent performing 1:1 timed codes.

## 2023-04-07 ENCOUNTER — HOSPITAL ENCOUNTER (OUTPATIENT)
Dept: MRI IMAGING | Facility: HOSPITAL | Age: 63
Discharge: HOME | End: 2023-04-07
Attending: NURSE PRACTITIONER | Admitting: NURSE PRACTITIONER
Payer: OTHER MISCELLANEOUS

## 2023-04-07 DIAGNOSIS — R20.2 NUMBNESS AND TINGLING OF LEG: ICD-10-CM

## 2023-04-07 DIAGNOSIS — R20.0 NUMBNESS AND TINGLING OF LEG: ICD-10-CM

## 2023-04-07 PROCEDURE — A9585 GADOBUTROL INJECTION: HCPCS | Performed by: NURSE PRACTITIONER

## 2023-04-07 PROCEDURE — 72158 MRI LUMBAR SPINE W/O & W/DYE: CPT

## 2023-04-07 PROCEDURE — 255N000002 HC RX 255 OP 636: Performed by: NURSE PRACTITIONER

## 2023-04-07 RX ORDER — GADOBUTROL 604.72 MG/ML
0.1 INJECTION INTRAVENOUS ONCE
Status: COMPLETED | OUTPATIENT
Start: 2023-04-07 | End: 2023-04-07

## 2023-04-07 RX ADMIN — GADOBUTROL 10 ML: 604.72 INJECTION INTRAVENOUS at 13:16

## 2023-04-12 ENCOUNTER — THERAPY VISIT (OUTPATIENT)
Dept: PHYSICAL THERAPY | Facility: CLINIC | Age: 63
End: 2023-04-12
Payer: OTHER MISCELLANEOUS

## 2023-04-12 ENCOUNTER — PRE VISIT (OUTPATIENT)
Dept: NEUROSURGERY | Facility: CLINIC | Age: 63
End: 2023-04-12
Payer: COMMERCIAL

## 2023-04-12 DIAGNOSIS — M54.41 ACUTE BILATERAL LOW BACK PAIN WITH BILATERAL SCIATICA: Primary | ICD-10-CM

## 2023-04-12 DIAGNOSIS — Z47.89 ENCOUNTER FOR OTHER ORTHOPEDIC AFTERCARE: ICD-10-CM

## 2023-04-12 DIAGNOSIS — M54.42 ACUTE BILATERAL LOW BACK PAIN WITH BILATERAL SCIATICA: Primary | ICD-10-CM

## 2023-04-12 PROCEDURE — 97110 THERAPEUTIC EXERCISES: CPT | Mod: GP | Performed by: PHYSICAL THERAPIST

## 2023-04-12 NOTE — TELEPHONE ENCOUNTER
NEUROSURGERY- NEW PREVISIT PLANNING       Record Status/Location     Referring Provider Epic Return   Diagnosis In 3/13/23 progress note Numbness and tingling of leg   MRI (HEAD, NECK, SPINE) Epic Lumbar W/WO Contrast 4/7/23  Lumbar WO Contrast 3/8/23   CT  NO   X-ray Epic Lumbar 12/8/22   INJECTION  NO   PHYSICAL THERAPY Epic 2023, multiple sessions   SURGERY Epic lumbar 2 to lumbar 3 and lumbar 4 to lumbar 5 hemilaminectomies, medial facetectomies, and foraminotomies and lumbar 4 to lumbar 5  bilateral microdiscectomies  Dr. Natarajan  DOS: 12/8/22

## 2023-04-13 ENCOUNTER — OFFICE VISIT (OUTPATIENT)
Dept: NEUROSURGERY | Facility: CLINIC | Age: 63
End: 2023-04-13
Payer: COMMERCIAL

## 2023-04-13 ENCOUNTER — OFFICE VISIT (OUTPATIENT)
Dept: PHYSICAL MEDICINE AND REHAB | Facility: CLINIC | Age: 63
End: 2023-04-13
Attending: NURSE PRACTITIONER
Payer: OTHER MISCELLANEOUS

## 2023-04-13 VITALS — OXYGEN SATURATION: 95 % | SYSTOLIC BLOOD PRESSURE: 175 MMHG | DIASTOLIC BLOOD PRESSURE: 77 MMHG | HEART RATE: 67 BPM

## 2023-04-13 DIAGNOSIS — G83.4 CAUDA EQUINA COMPRESSION (H): ICD-10-CM

## 2023-04-13 DIAGNOSIS — M54.41 ACUTE BILATERAL LOW BACK PAIN WITH BILATERAL SCIATICA: ICD-10-CM

## 2023-04-13 DIAGNOSIS — R20.2 NUMBNESS AND TINGLING OF LEG: ICD-10-CM

## 2023-04-13 DIAGNOSIS — R20.0 NUMBNESS AND TINGLING OF LEG: ICD-10-CM

## 2023-04-13 DIAGNOSIS — M51.26 LUMBAR DISC HERNIATION: Primary | ICD-10-CM

## 2023-04-13 DIAGNOSIS — M54.42 ACUTE BILATERAL LOW BACK PAIN WITH BILATERAL SCIATICA: ICD-10-CM

## 2023-04-13 PROCEDURE — 99214 OFFICE O/P EST MOD 30 MIN: CPT | Performed by: NURSE PRACTITIONER

## 2023-04-13 PROCEDURE — 95886 MUSC TEST DONE W/N TEST COMP: CPT | Mod: RT | Performed by: PHYSICAL MEDICINE & REHABILITATION

## 2023-04-13 PROCEDURE — 95910 NRV CNDJ TEST 7-8 STUDIES: CPT | Performed by: PHYSICAL MEDICINE & REHABILITATION

## 2023-04-13 RX ORDER — PREGABALIN 100 MG/1
100 CAPSULE ORAL 3 TIMES DAILY
Qty: 90 CAPSULE | Refills: 0 | Status: SHIPPED | OUTPATIENT
Start: 2023-04-13

## 2023-04-13 ASSESSMENT — PAIN SCALES - GENERAL: PAINLEVEL: WORST PAIN (10)

## 2023-04-13 NOTE — LETTER
April 13, 2023      Richard Hickman  [address]  [place] [address]        To Whom It May Concern:    Richard Hickman  was seen on 4/13/23. He will remain out of work while he continues PT and work up for on going leg symptoms. Please excuse his accompanying caregiver from any conflicting work/commitments to care for patient as well.     Please contact me for questions or concerns.        Sincerely,        Rocio SNOWDENC  [facility] Neurosurgery  O. [phone]

## 2023-04-13 NOTE — PROGRESS NOTES
Neurosurgery clinic note:      A/P:  Roberto is a 64yo patient who is approx 3mos s/p L2-3 and L4-5 hemilaminectomies, medial facetectomies, and foraminotomies with L4-5 bilateral microdiscectomies on 12/8/2022 by Dr Natarajan. Preop c/o cauda equina syndrome including leg weakness, numbness, saddle anesthesia and urinary retention    Roberto is overall much better than preop. Symptoms from his back to his mid-thighs are gone. Bladder and bowel functioning and saddle anesthesia is much better. Going to the rest room independently, but still some bladder accidents. No bowel accidents. Now just intermittent tingling in the groin. He does endorse significant ongoing pain in his back and both legs from the knee down into the feet. Worse with prolonged sitting, sitting the wrong way, lying on his back, and lying on his sides. He has numbness/tingling in both feet and toes. EMG discussed with patient by Dr Natarajan, plan to give the nerves more time to calm down.     Will increase lyrica and encouraged Roberto to call his prior suboxone provider to resume treatment. Continue PT exercises and follow up with WANDY in 3 mos on a Dr Natarajan clinic day.    Plan:  1. Increase lyrica to 100mg three times daily   2. Call your prior pain specialist doctor to resume suboxone treatment  3. Continue Physical therapy exercises   4. Follow up with WANDY in 3 mos on a Dr Natarajan clinic day to review symptoms. No new imaging needed for this appt.      Exam:    General: seated in NAD, appears comfortable    Strength:  Hip flexors: 5/5 right, 5/5 left  Quads: 5/5 right, 5/5 left  Hamstrings: 5/5 right, 5/5 left  Dorsiflexion: 5/5 right, 5/5 left  Plantarflexion 5/5 right, 5/5 left  EHL: 5/5 right, 5/5 left    Sensation is decreased to light touch tessy feet, lower legs, to mid-thighs. Sensation intact to light touch proximal thighs tessy.    Incision not viewed    Gait deferred    Imaging:  EMG done just prior to clinic appt. No report available yet in  system. Dr Natarajan reviewed EMG results with Dr Stone at time of visit and then discussed results with patient        Rocio Smith [facility] Neurosurgery  O. [phone]

## 2023-04-13 NOTE — PATIENT INSTRUCTIONS
Thank you for choosing the [facility] for your EMG testing.    The ordering provider will receive your final EMG results within the next few days.  Please follow up with your provider for the results and further treatment recommendations.

## 2023-04-13 NOTE — LETTER
4/13/2023         RE: Richard Hickman  [address]  [place] [address]        Dear Colleague,    Thank you for referring your patient, Richard Hickman, to the [facility] SPINE AND NEUROSURGERY. Please see a copy of my visit note below.    Neurosurgery clinic note:      A/P:  Roberto is a 64yo patient who is approx 3mos s/p L2-3 and L4-5 hemilaminectomies, medial facetectomies, and foraminotomies with L4-5 bilateral microdiscectomies on 12/8/2022 by Dr Natarajan. Preop c/o cauda equina syndrome including leg weakness, numbness, saddle anesthesia and urinary retention    Roberto is overall much better than preop. Symptoms from his back to his mid-thighs are gone. Bladder and bowel functioning and saddle anesthesia is much better. Going to the rest room independently, but still some bladder accidents. No bowel accidents. Now just intermittent tingling in the groin. He does endorse significant ongoing pain in his back and both legs from the knee down into the feet. Worse with prolonged sitting, sitting the wrong way, lying on his back, and lying on his sides. He has numbness/tingling in both feet and toes. EMG discussed with patient by Dr Natarajan, plan to give the nerves more time to calm down.     Will increase lyrica and encouraged Roberto to call his prior suboxone provider to resume treatment. Continue PT exercises and follow up with WANDY in 3 mos on a Dr Natarajan clinic day.    Plan:  1. Increase lyrica to 100mg three times daily   2. Call your prior pain specialist doctor to resume suboxone treatment  3. Continue Physical therapy exercises   4. Follow up with WANDY in 3 mos on a Dr Natarajan clinic day to review symptoms. No new imaging needed for this appt.      Exam:    General: seated in NAD, appears comfortable    Strength:  Hip flexors: 5/5 right, 5/5 left  Quads: 5/5 right, 5/5 left  Hamstrings: 5/5 right, 5/5 left  Dorsiflexion: 5/5 right, 5/5 left  Plantarflexion 5/5 right, 5/5 left  EHL: 5/5 right,  5/5 left    Sensation is decreased to light touch tessy feet, lower legs, to mid-thighs. Sensation intact to light touch proximal thighs tessy.    Incision not viewed    Gait deferred    Imaging:  EMG done just prior to clinic appt. No report available yet in system. Dr Natarajan reviewed EMG results with Dr Stone at time of visit and then discussed results with patient        Rocio Smith [facility] Neurosurgery  O. [phone]          Again, thank you for allowing me to participate in the care of your patient.        Sincerely,        LINNEA Stafford CNP

## 2023-04-13 NOTE — PROGRESS NOTES
[facility]  [address] [place] [address] [address]  [place] [address]  Office: [phone] Fax: [phone]    Electromyography and Nerve Conduction Study Report        Indication: Patient presents at the request of Gloria Blood CNP for a bilateral lower extremity EMG.  He is approximately 5 months status post lumbar decompression.  He has right greater than left lower extremity paresthesias from the knees distally to the feet throughout the entire leg.  On exam he is decree sensation to light touch in the right medial malleolus, lateral malleolus and dorsal right foot.  2+ patellar 0 Achilles reflexes bilaterally, normal muscle strength throughout the major muscular to the bilateral lower extremities.        Pt Exam Discussion (Communication Barriers):  Electromyography and nerve conduction testing, including associated discomfort, risks, benefits, and alternatives was discussed with the patient prior to the procedure.  No learning/ communication barriers; patient verbalized understanding of procedure.  Informed consent was obtained.           Pt Assessment:  Testing was successfully completed; patient tolerated testing well.       Blood Thinners: None Skin Temperature: Warmed  30.6                   EMG/NCS  results:     Nerve Conduction Studies  Motor Sites      Amplitude Segment CV Distal Latency F-Latency F-Estimate Temp Comment   Site (mV)  (m/s) (ms) ms ms  C    Left Fibular (EDB) Motor   Ankle 7.4 Ankle-EDB  4.8   24.5    Knee 6.2 Knee-Ankle 190 6.9   24.6    Right Fibular (EDB) Motor   Ankle 1.79 Ankle-EDB  5.4   24.6    Fib Head 1.30 Fib Head-Ankle 41 13.3   24.5    Knee *1.28 Knee-Ankle *41 15.9   24.5    Left Tibial (AH) Motor   Ankle 6.3 Ankle-AH  4.0   24.5    Knee *2.6 Knee-Ankle 40 15.3   24.6    Right Tibial (AH) Motor   Ankle 3.7 Ankle-AH  3.8   24.5    Knee *0.72 Knee-Ankle *38 15.6   24.5      Sensory Sites      Amplitude CV Onset Lat Peak Lat Temp Comment   Site  ( V) (m/s) (ms) (ms)  C    Right Superficial Fibular Sensory   Lower Leg-Ankle 11 44 3.2 3.6 24.5    Left Sural Sensory   B-Ankle 12 41 3.4 4.0 24.6    Right Sural Sensory   B-Ankle 16 45 3.1 3.6 25.9        NCS Waveforms:    Motor                Sensory             Electromyography     Side Muscle Nerve Root Ins Act Fibs Psw Amp Dur Poly Recrt Int Pat Comment   Right AntTibialis Dp Br Fibular L4-5 Nml Nml Nml Nml Nml 0 Nml Nml    Right Gastroc Tibial S1-2 *Incr *1+ *1+ Nml Nml 0 Nml Nml    Right Fibularis Long Sup Br Fibular L5-S1 Nml Nml Nml Nml Nml 0 Nml Nml    Right ExtHallLong Dp Br Fibular L5, S1 Nml Nml Nml *Incr *>12ms 0 Nml Nml    Right TensorFascLat SupGluteal L4-5, S1 Nml Nml Nml *Incr *>12ms 0 Nml Nml    Right GluteusMax InfGluteal L5-S2 *Incr Nml *1+ Nml Nml 0 Nml Nml    Right VastusLat Femoral L2-4 Nml Nml Nml Nml Nml 0 Nml Nml    Left AntTibialis Dp Br Fibular L4-5 Nml Nml Nml Nml Nml 0 Nml Nml    Left Gastroc Tibial S1-2 *Incr *1+ *1+ Nml Nml 0 Nml Nml    Left Fibularis Long Sup Br Fibular L5-S1 Nml Nml Nml Nml Nml 0 Nml Nml    Left VastusLat Femoral L2-4 Nml Nml Nml Nml Nml 0 Nml Nml    Left TensorFascLat SupGluteal L4-5, S1 Nml Nml Nml Nml Nml 0 Nml Nml    Left GluteusMax InfGluteal L5-S2 Nml Nml Nml Nml Nml 0 Nml Nml          Comment NCS: Abnormal study  1.  Mildly reduced amplitude right lower extremity tibial and peroneal CMAP's  2.  Normal bilateral sural SNAPs and right superficial peroneal SNAP  3.  Normal left peroneal and tibial CMAP's    Comment EMG: Abnormal study  1.  Increased insertional activity with positive waves and fibrillation potentials right medial gastrocnemius muscle and gluteus prosper with prolonged motor unit potential duration right tensor fascia jens and extensor houses longus.  Remainder right lower extremity needle EMG normal.  Lumbar paraspinals not tested secondary to prior lumbar surgery.  2.  Increased insertional activity left medial gastrocnemius muscle with the  remainder of the left lower extremity needle EMG normal.  Lumbar paraspinals not tested secondary to prior lumbar surgery.    Interpretation: Abnormal study: There is electrodiagnostic evidence of:    1.  Right S1 radiculopathy, active.      2.  Right L5 radiculopathy, chronic.  I cannot completely exclude an active component.    3.  Increased insertional activity with denervation potentials of the left medial gastrocnemius muscle.  In isolation, these findings are nonspecific and nondiagnostic.  Under the correct clinical condition, they can be observed in an active left S1 radiculopathy.      4.  There is no electrodiagnostic evidence of  focal neuropathy or peripheral polyneuropathy in the bilateral lower extremities.     The testing was completed in its entirety by the physician.      It was our pleasure caring for your patient today, if there any questions or concerns please do not hesitate to contact us.

## 2023-04-13 NOTE — NURSING NOTE
"Richard Hickman is a 63 year old male who presents for:  Chief Complaint   Patient presents with     RECHECK        Initial Vitals:  BP (!) 175/77   Pulse 67   SpO2 95%  Estimated body mass index is 30.13 kg/m  as calculated from the following:    Height as of 3/9/23: 5' 10\" (1.778 m).    Weight as of 3/9/23: 210 lb (95.3 kg).. There is no height or weight on file to calculate BSA. BP completed using cuff size: regular  Worst Pain (10)    Jas Willis    "

## 2023-04-13 NOTE — LETTER
4/13/2023         RE: Richard Hickman  [address]  [facility] [address]        Dear Colleague,    Thank you for referring your patient, Richard Hickman, to the [facility]. Please see a copy of my visit note below.    [facility]  [address] [place] [id number]  Office: [phone] Fax: [phone]    Electromyography and Nerve Conduction Study Report        Indication: Patient presents at the request of Gloria Blood CNP for a bilateral lower extremity EMG.  He is approximately 5 months status post lumbar decompression.  He has right greater than left lower extremity paresthesias from the knees distally to the feet throughout the entire leg.  On exam he is decree sensation to light touch in the right medial malleolus, lateral malleolus and dorsal right foot.  2+ patellar 0 Achilles reflexes bilaterally, normal muscle strength throughout the major muscular to the bilateral lower extremities.        Pt Exam Discussion (Communication Barriers):  Electromyography and nerve conduction testing, including associated discomfort, risks, benefits, and alternatives was discussed with the patient prior to the procedure.  No learning/ communication barriers; patient verbalized understanding of procedure.  Informed consent was obtained.           Pt Assessment:  Testing was successfully completed; patient tolerated testing well.       Blood Thinners: None Skin Temperature: Warmed  30.6                   EMG/NCS  results:     Nerve Conduction Studies  Motor Sites      Amplitude Segment CV Distal Latency F-Latency F-Estimate Temp Comment   Site (mV)  (m/s) (ms) ms ms  C    Left Fibular (EDB) Motor   Ankle 7.4 Ankle-EDB  4.8   24.5    Knee 6.2 Knee-Ankle 190 6.9   24.6    Right Fibular (EDB) Motor   Ankle 1.79 Ankle-EDB  5.4   24.6    Fib Head 1.30 Fib Head-Ankle 41 13.3   24.5    Knee *1.28 Knee-Ankle *41 15.9   24.5    Left Tibial (AH)  Motor   Ankle 6.3 Ankle-AH  4.0   24.5    Knee *2.6 Knee-Ankle 40 15.3   24.6    Right Tibial (AH) Motor   Ankle 3.7 Ankle-AH  3.8   24.5    Knee *0.72 Knee-Ankle *38 15.6   24.5      Sensory Sites      Amplitude CV Onset Lat Peak Lat Temp Comment   Site ( V) (m/s) (ms) (ms)  C    Right Superficial Fibular Sensory   Lower Leg-Ankle 11 44 3.2 3.6 24.5    Left Sural Sensory   B-Ankle 12 41 3.4 4.0 24.6    Right Sural Sensory   B-Ankle 16 45 3.1 3.6 25.9        NCS Waveforms:    Motor                Sensory             Electromyography     Side Muscle Nerve Root Ins Act Fibs Psw Amp Dur Poly Recrt Int Pat Comment   Right AntTibialis Dp Br Fibular L4-5 Nml Nml Nml Nml Nml 0 Nml Nml    Right Gastroc Tibial S1-2 *Incr *1+ *1+ Nml Nml 0 Nml Nml    Right Fibularis Long Sup Br Fibular L5-S1 Nml Nml Nml Nml Nml 0 Nml Nml    Right ExtHallLong Dp Br Fibular L5, S1 Nml Nml Nml *Incr *>12ms 0 Nml Nml    Right TensorFascLat SupGluteal L4-5, S1 Nml Nml Nml *Incr *>12ms 0 Nml Nml    Right GluteusMax InfGluteal L5-S2 *Incr Nml *1+ Nml Nml 0 Nml Nml    Right VastusLat Femoral L2-4 Nml Nml Nml Nml Nml 0 Nml Nml    Left AntTibialis Dp Br Fibular L4-5 Nml Nml Nml Nml Nml 0 Nml Nml    Left Gastroc Tibial S1-2 *Incr *1+ *1+ Nml Nml 0 Nml Nml    Left Fibularis Long Sup Br Fibular L5-S1 Nml Nml Nml Nml Nml 0 Nml Nml    Left VastusLat Femoral L2-4 Nml Nml Nml Nml Nml 0 Nml Nml    Left TensorFascLat SupGluteal L4-5, S1 Nml Nml Nml Nml Nml 0 Nml Nml    Left GluteusMax InfGluteal L5-S2 Nml Nml Nml Nml Nml 0 Nml Nml          Comment NCS: Abnormal study  1.  Mildly reduced amplitude right lower extremity tibial and peroneal CMAP's  2.  Normal bilateral sural SNAPs and right superficial peroneal SNAP  3.  Normal left peroneal and tibial CMAP's    Comment EMG: Abnormal study  1.  Increased insertional activity with positive waves and fibrillation potentials right medial gastrocnemius muscle and gluteus prosper with prolonged motor unit potential  duration right tensor fascia jens and extensor houses longus.  Remainder right lower extremity needle EMG normal.  Lumbar paraspinals not tested secondary to prior lumbar surgery.  2.  Increased insertional activity left medial gastrocnemius muscle with the remainder of the left lower extremity needle EMG normal.  Lumbar paraspinals not tested secondary to prior lumbar surgery.    Interpretation: Abnormal study: There is electrodiagnostic evidence of:    1.  Right S1 radiculopathy, active.      2.  Right L5 radiculopathy, chronic.  I cannot completely exclude an active component.    3.  Increased insertional activity with denervation potentials of the left medial gastrocnemius muscle.  In isolation, these findings are nonspecific and nondiagnostic.  Under the correct clinical condition, they can be observed in an active left S1 radiculopathy.      4.  There is no electrodiagnostic evidence of  focal neuropathy or peripheral polyneuropathy in the bilateral lower extremities.     The testing was completed in its entirety by the physician.      It was our pleasure caring for your patient today, if there any questions or concerns please do not hesitate to contact us.            Again, thank you for allowing me to participate in the care of your patient.        Sincerely,        Manfred Stone, DO

## 2023-04-13 NOTE — PATIENT INSTRUCTIONS
Roberto is a 64yo patient who is approx 3mos s/p L2-3 and L4-5 hemilaminectomies, medial facetectomies, and foraminotomies with L4-5 bilateral microdiscectomies on 12/8/2022 by Dr Natarajan. Preop c/o cauda equina syndrome including leg weakness, numbness, saddle anesthesia and urinary retention    Roberto is overall much better than preop. Symptoms from his back to his mid-thighs are gone. Bladder and bowel functioning and saddle anesthesia is much better. Now just intermittent tingling in the groin. He does endorse significant ongoing pain in his back and both legs from the knee down into the feet.  EMG discussed with patient by Dr Natarajan, plan to give the nerves more time to calm down.     Plan:  1. Increase lyrica to 100mg three times daily   2. Call your prior pain specialist doctor to resume suboxone treatment  3. Continue Physical therapy exercises   4. Follow up with [facility] in 3 mos on a Dr Natarajan clinic day to review symptoms. No new imaging needed for this appt.    Rocio Smith FNP-C  [facility]  O. [phone]

## 2023-04-17 ENCOUNTER — MEDICAL CORRESPONDENCE (OUTPATIENT)
Dept: NEUROSURGERY | Facility: CLINIC | Age: 63
End: 2023-04-17
Payer: COMMERCIAL

## 2023-04-19 ENCOUNTER — THERAPY VISIT (OUTPATIENT)
Dept: PHYSICAL THERAPY | Facility: CLINIC | Age: 63
End: 2023-04-19
Payer: OTHER MISCELLANEOUS

## 2023-04-19 DIAGNOSIS — Z47.89 ENCOUNTER FOR OTHER ORTHOPEDIC AFTERCARE: ICD-10-CM

## 2023-04-19 DIAGNOSIS — M54.41 ACUTE BILATERAL LOW BACK PAIN WITH BILATERAL SCIATICA: Primary | ICD-10-CM

## 2023-04-19 DIAGNOSIS — M54.42 ACUTE BILATERAL LOW BACK PAIN WITH BILATERAL SCIATICA: Primary | ICD-10-CM

## 2023-04-19 PROCEDURE — 97110 THERAPEUTIC EXERCISES: CPT | Mod: GP | Performed by: PHYSICAL THERAPIST

## 2023-04-26 ENCOUNTER — THERAPY VISIT (OUTPATIENT)
Dept: PHYSICAL THERAPY | Facility: CLINIC | Age: 63
End: 2023-04-26
Payer: OTHER MISCELLANEOUS

## 2023-04-26 DIAGNOSIS — M54.42 ACUTE BILATERAL LOW BACK PAIN WITH BILATERAL SCIATICA: Primary | ICD-10-CM

## 2023-04-26 DIAGNOSIS — M54.41 ACUTE BILATERAL LOW BACK PAIN WITH BILATERAL SCIATICA: Primary | ICD-10-CM

## 2023-04-26 DIAGNOSIS — Z47.89 ENCOUNTER FOR OTHER ORTHOPEDIC AFTERCARE: ICD-10-CM

## 2023-04-26 PROCEDURE — 97110 THERAPEUTIC EXERCISES: CPT | Mod: GP | Performed by: PHYSICAL THERAPIST

## 2023-05-03 ENCOUNTER — THERAPY VISIT (OUTPATIENT)
Dept: PHYSICAL THERAPY | Facility: CLINIC | Age: 63
End: 2023-05-03
Payer: OTHER MISCELLANEOUS

## 2023-05-03 DIAGNOSIS — M54.41 ACUTE BILATERAL LOW BACK PAIN WITH BILATERAL SCIATICA: Primary | ICD-10-CM

## 2023-05-03 DIAGNOSIS — Z47.89 ENCOUNTER FOR OTHER ORTHOPEDIC AFTERCARE: ICD-10-CM

## 2023-05-03 DIAGNOSIS — M54.42 ACUTE BILATERAL LOW BACK PAIN WITH BILATERAL SCIATICA: Primary | ICD-10-CM

## 2023-05-03 PROCEDURE — 97110 THERAPEUTIC EXERCISES: CPT | Mod: GP | Performed by: PHYSICAL THERAPIST

## 2023-05-10 ENCOUNTER — THERAPY VISIT (OUTPATIENT)
Dept: PHYSICAL THERAPY | Facility: CLINIC | Age: 63
End: 2023-05-10
Payer: OTHER MISCELLANEOUS

## 2023-05-10 DIAGNOSIS — Z47.89 ENCOUNTER FOR OTHER ORTHOPEDIC AFTERCARE: ICD-10-CM

## 2023-05-10 DIAGNOSIS — M54.41 ACUTE BILATERAL LOW BACK PAIN WITH BILATERAL SCIATICA: Primary | ICD-10-CM

## 2023-05-10 DIAGNOSIS — M54.42 ACUTE BILATERAL LOW BACK PAIN WITH BILATERAL SCIATICA: Primary | ICD-10-CM

## 2023-05-10 PROCEDURE — 97110 THERAPEUTIC EXERCISES: CPT | Mod: GP | Performed by: PHYSICAL THERAPIST

## 2023-05-10 NOTE — PROGRESS NOTES
"Subjective:  The history is provided by the patient. No  was used.     Physical Exam                    Objective:    Gait:    Assistive Devices:  None  Deviations:  General Deviations:  Base of support incr, toe out R, toe out L and stride length decr               Lumbar/SI Evaluation  ROM:    AROM Lumbar:   Flexion:            58  Ext:                    14   Side Bend:        Left:  24    Right:  8  Rotation:           Left:  Mod limitation    Right:  Mod limitation  Side Glide:        Left:     Right:           Lumbar Myotomes:    T12-L3 (Hip Flex):  Left: 4+    Right: 4  L2-4 (Quads):  Left:  5-    Right:  5-  L4 (Ankle DF):  Left:  4+    Right:  4+  L5 (Great Toe Ext): Left: 4    Right: 4   S1 (Toe Raise):  Left: 2+    Right: 2+      Lumbar Dermtomes:        L2 Left:  Normal-light touch     L2 Right:  Normal-light touch  L3 Left:  Normal-light touch     L3 Right:  Hypo-light touch  L4 Left:  Hypo-light touch       L4 Right:  Hypo-light touch  L5 Left:  Hypo-light touch     L5 Right:  Hypo-light touch  S1 Left:  Hyper-light touch     S1 Right:  Hypo-light touch  Neural Tension/Mobility:      Left side:SLR; SLR w/DF or Slump  negative.   Right side:   SLR w/DF; Slump and SLR positive.  Lumbar Palpation:  Palpation (lumbar): palpation of surgical site causes increase in symptoms of numbness and tingling in R lower extremity.  Tenderness present at Left:    Erector Spinae  Tenderness present at Right: Erector Spinae  Functional Tests:  Core strength and proprioception lumbar: 30s sit to stand test: 5.                                                         General     ROS    Assessment/Plan:    PROGRESS  REPORT    Progress reporting period is from 02/08/2023 to 5/10/2023.       SUBJECTIVE  Subjective changes noted by patient:  The patient presents to the clinic stating he has progressed past the initial recovery stage of his surgery, and states he feels he has begun his \"rebuilding phase.\" He " "notes that he feels much better than before his surgery, however he states he is highly limited in his daily activities as he has experienced a life altering injury. He notes that he spoke to a neutral third party doctor who states he will likely require an additional surgery, and recommends that he continue with physical therapy until he has this can occur. The patient notes he appreciates the doctor's input, but states he needs time to contemplate an additional surgery. The patient notes that he is able to walk for up to 15 minutes without his cane before requiring a break, he is unable to lift heavy objects without aggravating his back and leg pain, and due to a lack of sensation in his right lower leg he drives using his cane to press the gas and break pedals in his car. He reports that while walking he feels that his feet feel like \"I am walking on grapes\" \"I feel like I stepped on a sticky mouse trap\" \"it feels like the skin on my lower legs was ripped off, someone shoved styrofoam against my muscles, then put my skin back on.\"The patient states he feels he continues to remain appropriate for physical therapy, and states he is highly motivated to continue physical therapy as he feels he has experienced improvements in his strength and functional level.   Current pain level is 4/10  .     Initial Pain level: 8/10.   Changes in function:  Yes (See Goal flowsheet attached for changes in current functional level)  Adverse reaction to treatment or activity: None    OBJECTIVE  Changes noted in objective findings:  Yes, physical exam     ASSESSMENT/PLAN  Updated problem list and treatment plan: Diagnosis 1:  Acute low back pain with bilateral sciatica  Pain -  hot/cold therapy, electric stimulation, manual therapy, self management, education and home program  Decreased ROM/flexibility - manual therapy, therapeutic exercise and home program  Decreased strength - therapeutic exercise, therapeutic activities and home " program  Impaired balance - neuro re-education, therapeutic activities and home program  Impaired gait - gait training and home program  Impaired muscle performance - neuro re-education and home program  Decreased function - therapeutic activities and home program  STG/LTGs have been met or progress has been made towards goals:  Yes (See Goal flow sheet completed today.)  Assessment of Progress: The patient's condition has potential to improve.  Patient is meeting short term goals and is progressing towards long term goals.  Self Management Plans:  Patient has been instructed in a home treatment program.  I have re-evaluated this patient and find that the nature, scope, duration and intensity of the therapy is appropriate for the medical condition of the patient.  Richard continues to require the following intervention to meet STG and LTG's:  PT    Recommendations:  This patient would benefit from continued therapy.     Frequency:  1 X week, once daily  Duration:  for 8 weeks        Please refer to the daily flowsheet for treatment today, total treatment time and time spent performing 1:1 timed codes.

## 2023-06-08 ENCOUNTER — TELEPHONE (OUTPATIENT)
Dept: NEUROSURGERY | Facility: CLINIC | Age: 63
End: 2023-06-08
Payer: COMMERCIAL

## 2023-06-08 NOTE — TELEPHONE ENCOUNTER
Patient is requesting for a letter for work about restrictions and driving so patient can give it to work. Please advise

## 2023-06-09 ENCOUNTER — THERAPY VISIT (OUTPATIENT)
Dept: PHYSICAL THERAPY | Facility: CLINIC | Age: 63
End: 2023-06-09
Payer: OTHER MISCELLANEOUS

## 2023-06-09 DIAGNOSIS — M54.41 ACUTE BILATERAL LOW BACK PAIN WITH BILATERAL SCIATICA: Primary | ICD-10-CM

## 2023-06-09 DIAGNOSIS — M54.42 ACUTE BILATERAL LOW BACK PAIN WITH BILATERAL SCIATICA: Primary | ICD-10-CM

## 2023-06-09 DIAGNOSIS — Z47.89 ENCOUNTER FOR OTHER ORTHOPEDIC AFTERCARE: ICD-10-CM

## 2023-06-09 PROCEDURE — 99207 PR NO CHARGE NURSE ONLY: CPT | Performed by: NURSE PRACTITIONER

## 2023-06-09 PROCEDURE — 97116 GAIT TRAINING THERAPY: CPT | Mod: GP | Performed by: PHYSICAL THERAPIST

## 2023-06-09 PROCEDURE — 97140 MANUAL THERAPY 1/> REGIONS: CPT | Mod: GP | Performed by: PHYSICAL THERAPIST

## 2023-06-09 NOTE — TELEPHONE ENCOUNTER
Patient returning call &  following up on Letter for WC- patient stated he cannot sit for a long period and needs something to submit to work.    Call back #: [phone]

## 2023-06-12 ENCOUNTER — TELEPHONE (OUTPATIENT)
Dept: NEUROSURGERY | Facility: CLINIC | Age: 63
End: 2023-06-12
Payer: COMMERCIAL

## 2023-06-12 NOTE — TELEPHONE ENCOUNTER
Spouse (ANJELICA) would like to know if she can come in and  patients paper work fo work.  Thank you

## 2023-06-13 ENCOUNTER — THERAPY VISIT (OUTPATIENT)
Dept: PHYSICAL THERAPY | Facility: CLINIC | Age: 63
End: 2023-06-13
Payer: OTHER MISCELLANEOUS

## 2023-06-13 DIAGNOSIS — M54.41 ACUTE BILATERAL LOW BACK PAIN WITH BILATERAL SCIATICA: Primary | ICD-10-CM

## 2023-06-13 DIAGNOSIS — Z47.89 ENCOUNTER FOR OTHER ORTHOPEDIC AFTERCARE: ICD-10-CM

## 2023-06-13 DIAGNOSIS — M54.42 ACUTE BILATERAL LOW BACK PAIN WITH BILATERAL SCIATICA: Primary | ICD-10-CM

## 2023-06-13 PROCEDURE — 99207 PR NO CHARGE LOS: CPT | Mod: GP | Performed by: PHYSICAL THERAPIST

## 2023-06-14 ENCOUNTER — TELEPHONE (OUTPATIENT)
Dept: NEUROSURGERY | Facility: CLINIC | Age: 63
End: 2023-06-14
Payer: COMMERCIAL

## 2023-06-14 NOTE — TELEPHONE ENCOUNTER
Patient needs work restrictions letter w/specifics. Stating he can't drive or sit too long because his legs will give out  Email to [email or web address]

## 2023-06-20 ENCOUNTER — THERAPY VISIT (OUTPATIENT)
Dept: PHYSICAL THERAPY | Facility: CLINIC | Age: 63
End: 2023-06-20
Payer: OTHER MISCELLANEOUS

## 2023-06-20 DIAGNOSIS — M54.42 ACUTE BILATERAL LOW BACK PAIN WITH BILATERAL SCIATICA: Primary | ICD-10-CM

## 2023-06-20 DIAGNOSIS — Z47.89 ENCOUNTER FOR OTHER ORTHOPEDIC AFTERCARE: ICD-10-CM

## 2023-06-20 DIAGNOSIS — M54.41 ACUTE BILATERAL LOW BACK PAIN WITH BILATERAL SCIATICA: Primary | ICD-10-CM

## 2023-06-20 PROCEDURE — 97110 THERAPEUTIC EXERCISES: CPT | Mod: GP | Performed by: PHYSICAL THERAPIST

## 2023-06-20 PROCEDURE — 97112 NEUROMUSCULAR REEDUCATION: CPT | Mod: GP | Performed by: PHYSICAL THERAPIST

## 2023-07-11 NOTE — PROGRESS NOTES
Neurosurgery Progress Note: 7/13/2023     A/P: status post L2-3 and L4-5 hemilaminectomies, medial facetectomies, and foraminotomies with L4-5 bilateral microdiscectomies on 12/8/2022 by Dr Natarajan. Preop c/o cauda equina syndrome including leg weakness, numbness, saddle anesthesia and urinary retention      Plan: Mr. Hickman is doing very well he has been advised to remain as active as possible. No further follow up indicated but he understands that should any symptoms arise to contact the office.     Mr. Hickman is a pleasant 63 year male who presents status post L2-3 and L4-5 hemilaminectomies, medial facetectomies, and foraminotomies with L4-5 bilateral microdiscectomies on 12/8/2022 by Dr Natarajan. He states that he is doing very well. He notes that his previous radicular leg pain has resolved and denies any back pain presently. He has complaint of bilateral knee pain but notes that it does not limit his activity. He feels that his saddle anesthesia has resolved and now has urinary urgency but feels it continues to improve as well. Overall he is pleased with his progress.     Exam:  BP (!) 143/96   Pulse 63   SpO2 99%     General: alert and oriented x3     Strength is 5/5 in bilateral lower extremities     Sensation is intact throughout lower extremities    Gait is smooth and coordinated    Incision: well healed without erythema, induration, or drainage      Dana Danielle PA-C  [facility]  O: [phone]

## 2023-07-13 ENCOUNTER — OFFICE VISIT (OUTPATIENT)
Dept: NEUROSURGERY | Facility: CLINIC | Age: 63
End: 2023-07-13
Payer: OTHER MISCELLANEOUS

## 2023-07-13 VITALS — HEART RATE: 63 BPM | SYSTOLIC BLOOD PRESSURE: 143 MMHG | OXYGEN SATURATION: 99 % | DIASTOLIC BLOOD PRESSURE: 96 MMHG

## 2023-07-13 DIAGNOSIS — G83.4 CAUDA EQUINA COMPRESSION (H): Primary | ICD-10-CM

## 2023-07-13 DIAGNOSIS — M51.26 LUMBAR DISC HERNIATION: ICD-10-CM

## 2023-07-13 PROCEDURE — 99213 OFFICE O/P EST LOW 20 MIN: CPT | Performed by: PHYSICIAN ASSISTANT

## 2023-07-13 ASSESSMENT — PAIN SCALES - GENERAL: PAINLEVEL: NO PAIN (1)

## 2023-07-13 NOTE — NURSING NOTE
"Richard Hickman is a 63 year old male who presents for:  Chief Complaint   Patient presents with     RECHECK        Initial Vitals:  BP (!) 143/96   Pulse 63   SpO2 99%  Estimated body mass index is 30.13 kg/m  as calculated from the following:    Height as of 3/9/23: 5' 10\" (1.778 m).    Weight as of 3/9/23: 210 lb (95.3 kg).. There is no height or weight on file to calculate BSA. BP completed using cuff size: regular  No Pain (1)    Jas Willis    "

## 2023-07-13 NOTE — PATIENT INSTRUCTIONS
Mr. Hickman is doing very well he has been advised to remain as active as possible. No further follow up indicated but he understands that should any symptoms arise to contact the office.

## 2023-07-13 NOTE — LETTER
7/13/2023         RE: Richard Hickman  [address]  [place] [address]        Dear Colleague,    Thank you for referring your patient, Richard Hickman, to the [facility]. Please see a copy of my visit note below.    Neurosurgery Progress Note: 7/13/2023     A/P: status post L2-3 and L4-5 hemilaminectomies, medial facetectomies, and foraminotomies with L4-5 bilateral microdiscectomies on 12/8/2022 by Dr Natarajan. Preop c/o cauda equina syndrome including leg weakness, numbness, saddle anesthesia and urinary retention      Plan: Mr. Hickman is doing very well he has been advised to remain as active as possible. No further follow up indicated but he understands that should any symptoms arise to contact the office.     Mr. Hickman is a pleasant 63 year male who presents status post L2-3 and L4-5 hemilaminectomies, medial facetectomies, and foraminotomies with L4-5 bilateral microdiscectomies on 12/8/2022 by Dr Natarajan. He states that he is doing very well. He notes that his previous radicular leg pain has resolved and denies any back pain presently. He has complaint of bilateral knee pain but notes that it does not limit his activity. He feels that his saddle anesthesia has resolved and now has urinary urgency but feels it continues to improve as well. Overall he is pleased with his progress.     Exam:  BP (!) 143/96   Pulse 63   SpO2 99%     General: alert and oriented x3     Strength is 5/5 in bilateral lower extremities     Sensation is intact throughout lower extremities    Gait is smooth and coordinated    Incision: well healed without erythema, induration, or drainage      Dana Danielle PA-C  [facility]  O: [phone]      Again, thank you for allowing me to participate in the care of your patient.        Sincerely,        Dana Danielle PA-C

## 2024-05-17 PROBLEM — M54.41 ACUTE BILATERAL LOW BACK PAIN WITH BILATERAL SCIATICA: Status: RESOLVED | Noted: 2023-02-09 | Resolved: 2024-05-17

## 2024-05-17 PROBLEM — Z47.89 ENCOUNTER FOR OTHER ORTHOPEDIC AFTERCARE: Status: RESOLVED | Noted: 2023-02-09 | Resolved: 2024-05-17

## 2024-05-17 PROBLEM — M54.42 ACUTE BILATERAL LOW BACK PAIN WITH BILATERAL SCIATICA: Status: RESOLVED | Noted: 2023-02-09 | Resolved: 2024-05-17

## 2024-05-17 NOTE — PROGRESS NOTES
DISCHARGE  Reason for Discharge: Patient has failed to schedule further appointments.    Equipment Issued: home exercise program    Discharge Plan: Patient to continue home program.    Referring Provider:  Dana Danielle PA-C       06/20/23 0500   Appointment Info   Signing clinician's name / credentials Colby Ellison DPT   Total/Authorized Visits 20 per PT protocol   Visits Used 13   Medical Diagnosis Acute bilateral low back pain with bilateral sciatica  Encounter for other orthopedic aftercare   PT Tx Diagnosis Low back pain, Surgical aftercare for cauda equina compression.   Quick Adds Certification   Progress Note/Certification   Start of Care Date 02/08/23   Onset of illness/injury or Date of Surgery 12/08/22   Therapy Frequency 1x daily /week   Predicted Duration 8 weeks   Certification date from 05/04/23   Certification date to 07/06/23   Progress Note Due Date 07/06/23   Progress Note Completed Date 05/04/23   GOALS   PT Goals 2   PT Goal 1   Goal Identifier Transfers   Goal Description The patient will be able to transfer in and out of a car, in and out of a chair, and in and out of bed independently   Rationale to maximize safety and independence within the community;to maximize safety and independence within the home;to maximize safety and independence with performance of ADLs and functional tasks;to maximize safety and independence with transportation;to maximize safety and independence with self cares   Goal Progress goal met   Date Met 04/06/23   PT Goal 2   Goal Identifier Ambulation   Goal Description The patient will be able to walk for 30 minutes independently noting an increase in pain of no greater than 2/10   Rationale   (for safe outdoor household ambulation;for safe community ambulation;for safe work place ambulation;to maintain proper body mechanics/posture while ambulating to avoid additional compensatory injury due to improper gait mechanics)   Goal Progress The patient can  currently ambulate for 15 minutes independently noting 3/10 pain   Target Date 07/06/23   Subjective Report   Subjective Report The patient presents to the clinic noting a great deal less stress due to a decrease in personal stressors. Notes he has returned to work. Notes he would like to continue with physical therapy to improve his ability to walk.   Treatment Interventions (PT)   Interventions Therapeutic Procedure/Exercise;Neuromuscular Re-education   Therapeutic Procedure/Exercise   Therapeutic Procedures: strength, endurance, ROM, flexibility minutes (45763) 14   Ther Proc 1 Nustep warmup   Ther Proc 1 - Details 5 min   PTRx Ther Proc 1 Plank   PTRx Ther Proc 1 - Details hands on counter ->  plinth x60s   PTRx Ther Proc 2 plank with shoulder taps, hands on plinth   PTRx Ther Proc 2 - Details x60s   PTRx Ther Proc 3 pushups on plinth   PTRx Ther Proc 3 - Details x11   PTRx Ther Proc 4 Sit <>stand   PTRx Ther Proc 4 - Details x10 no UE support   Skilled Intervention consistent redirection required   Patient Response/Progress patient notes significant fatigue in his core with each exercise   Neuromuscular Re-education   Neuromuscular re-ed of mvmt, balance, coord, kinesthetic sense, posture, proprioception minutes (16651) 9   Neuromuscular Re-education Neuro Re-ed 2   Neuro Re-ed 1 Posture education   Neuro Re-ed 1 - Details Proper sitting, standing posture to decrease tension on sciatic nerve   Skilled Intervention performed passively initially gradually allowing patient to perfrom actively with visual demonstration   Patient Response/Progress patient notes a decrease in shooting pain through his R LE   Neuro Re-ed 2 Sciatic nerve flossing in hamstring position   Neuro Re-ed 2 - Details 3x20 performed throughout session   Plan   Plan for next session Progress core strengthening   Total Session Time   Timed Code Treatment Minutes 23   Total Treatment Time (sum of timed and untimed services) 23

## (undated) DEVICE — CUSTOM PACK LUMBAR FUSION SNE5BLFHEA

## (undated) DEVICE — GLOVE UNDER INDICATOR PI SZ 6.5 LF 41665

## (undated) DEVICE — TRAY PREP DRY SKIN SCRUB 067

## (undated) DEVICE — GLOVE UNDER INDICATOR PI SZ 7.0 LF 41670

## (undated) DEVICE — SU MONOCRYL+ 4-0 18IN PS2 UND MCP496G

## (undated) DEVICE — DRAIN RESERVOIR 100ML JP 0070740

## (undated) DEVICE — ALCOHOL ISOPROPYL 4 OZ 70% IA7004

## (undated) DEVICE — POSITIONER ARM CRADLE LAMINECTOMY DISP

## (undated) DEVICE — GOWN IMPERVIOUS BREATHABLE SMART LG 89015

## (undated) DEVICE — SOL WATER IRRIG 1000ML BOTTLE 2F7114

## (undated) DEVICE — SOL NACL 0.9% IRRIG 1000ML BOTTLE 2F7124

## (undated) DEVICE — TOOL DISSECT MIDAS MR8 14CM MATCH HEAD 3MM MR8-14MH30

## (undated) DEVICE — Device

## (undated) DEVICE — SPONGE RAY-TEC 4X8" 7318

## (undated) DEVICE — DRAPE STERI TOWEL LG 1010

## (undated) DEVICE — MARKER SURG SKIN STRL 77734

## (undated) DEVICE — DECANTER VIAL 2006S

## (undated) DEVICE — DRESSING PRIMAPORE 4 X 3-1/8 66000317

## (undated) DEVICE — ESU ELEC BLADE 2.75" COATED/INSULATED E1455

## (undated) DEVICE — DRSG PRIMAPORE 03 1/8X6" 66000318

## (undated) DEVICE — CUSHION INSERT LG PRONE VIEW JACKSON TABLE

## (undated) DEVICE — WRAP LUMBAR COMPRESS COLD THERAPY 4632P

## (undated) DEVICE — SUCTION MANIFOLD NEPTUNE 2 SYS 1 PORT 702-025-000

## (undated) DEVICE — ESU PENCIL SMOKE EVAC W/ROCKER SWITCH 0703-047-000

## (undated) DEVICE — GLOVE BIOGEL PI ULTRATOUCH G SZ 6.5 42165

## (undated) DEVICE — PREP SCRUB SOL EXIDINE 4% CHG 4OZ 29002-404

## (undated) DEVICE — SU ETHILON 2-0 FS 18" 664G

## (undated) DEVICE — RX SURGIFLO HEMOSTATIC MATRIX W/THROMBIN 8ML 2994

## (undated) DEVICE — BLADE KNIFE SURG 11 371111

## (undated) DEVICE — NEEDLE SPINAL DISP 22GA X 3.5" QUINCKE 333320

## (undated) DEVICE — PLATE GROUNDING ADULT W/CORD 9165L

## (undated) DEVICE — PITCHER STERILE 1000ML  SSK9004A

## (undated) DEVICE — SU VICRYL+ 0 8-18 CT1/CR UND VCP840D

## (undated) DEVICE — DRAIN FLAT 10MM FULL PERF SIL 0070440

## (undated) DEVICE — SUTURE VICRYL+ 2-0 18 CT1/CR VLT VCP839D

## (undated) RX ORDER — EPHEDRINE SULFATE 50 MG/ML
INJECTION, SOLUTION INTRAMUSCULAR; INTRAVENOUS; SUBCUTANEOUS
Status: DISPENSED
Start: 2022-12-08

## (undated) RX ORDER — KETAMINE HYDROCHLORIDE 10 MG/ML
INJECTION INTRAMUSCULAR; INTRAVENOUS
Status: DISPENSED
Start: 2022-12-08

## (undated) RX ORDER — GINSENG 100 MG
CAPSULE ORAL
Status: DISPENSED
Start: 2022-12-08

## (undated) RX ORDER — FENTANYL CITRATE 50 UG/ML
INJECTION, SOLUTION INTRAMUSCULAR; INTRAVENOUS
Status: DISPENSED
Start: 2022-12-08

## (undated) RX ORDER — CEFAZOLIN SODIUM 1 G/3ML
INJECTION, POWDER, FOR SOLUTION INTRAMUSCULAR; INTRAVENOUS
Status: DISPENSED
Start: 2022-12-08

## (undated) RX ORDER — BUPIVACAINE HYDROCHLORIDE AND EPINEPHRINE 2.5; 5 MG/ML; UG/ML
INJECTION, SOLUTION INFILTRATION; PERINEURAL
Status: DISPENSED
Start: 2022-12-08